# Patient Record
Sex: MALE | Race: WHITE | NOT HISPANIC OR LATINO | URBAN - METROPOLITAN AREA
[De-identification: names, ages, dates, MRNs, and addresses within clinical notes are randomized per-mention and may not be internally consistent; named-entity substitution may affect disease eponyms.]

---

## 2017-08-30 ENCOUNTER — RECORDS - HEALTHEAST (OUTPATIENT)
Dept: LAB | Facility: CLINIC | Age: 77
End: 2017-08-30

## 2017-08-30 LAB
CHOLEST SERPL-MCNC: 157 MG/DL
FASTING STATUS PATIENT QL REPORTED: ABNORMAL
HDLC SERPL-MCNC: 39 MG/DL
LDLC SERPL CALC-MCNC: 104 MG/DL
TRIGL SERPL-MCNC: 72 MG/DL

## 2018-01-22 ENCOUNTER — AMBULATORY - HEALTHEAST (OUTPATIENT)
Dept: VASCULAR SURGERY | Facility: CLINIC | Age: 78
End: 2018-01-22

## 2018-01-22 DIAGNOSIS — I87.2 VENOUS INSUFFICIENCY OF RIGHT LOWER EXTREMITY: ICD-10-CM

## 2018-01-26 ENCOUNTER — OFFICE VISIT - HEALTHEAST (OUTPATIENT)
Dept: VASCULAR SURGERY | Facility: CLINIC | Age: 78
End: 2018-01-26

## 2018-01-26 ENCOUNTER — AMBULATORY - HEALTHEAST (OUTPATIENT)
Dept: VASCULAR SURGERY | Facility: CLINIC | Age: 78
End: 2018-01-26

## 2018-01-26 ENCOUNTER — RECORDS - HEALTHEAST (OUTPATIENT)
Dept: ADMINISTRATIVE | Facility: OTHER | Age: 78
End: 2018-01-26

## 2018-01-26 DIAGNOSIS — L97.919 VENOUS ULCER OF RIGHT LEG (H): ICD-10-CM

## 2018-01-26 DIAGNOSIS — E78.5 HYPERLIPEMIA: ICD-10-CM

## 2018-01-26 DIAGNOSIS — I83.019 VENOUS ULCER OF RIGHT LEG (H): ICD-10-CM

## 2018-01-26 DIAGNOSIS — I87.2 CHRONIC VENOUS INSUFFICIENCY: ICD-10-CM

## 2018-01-26 DIAGNOSIS — M72.2 PLANTAR FASCIITIS: ICD-10-CM

## 2018-01-26 RX ORDER — LOSARTAN POTASSIUM 25 MG/1
1 TABLET ORAL DAILY
Refills: 1 | Status: SHIPPED | COMMUNITY
Start: 2018-01-04

## 2018-01-26 RX ORDER — OXYCODONE AND ACETAMINOPHEN 5; 325 MG/1; MG/1
1 TABLET ORAL EVERY 4 HOURS PRN
Status: SHIPPED | COMMUNITY
Start: 2018-01-26

## 2018-01-26 RX ORDER — METRONIDAZOLE 10 MG/G
1 GEL TOPICAL DAILY
Status: SHIPPED | COMMUNITY
Start: 2018-01-26

## 2018-01-26 RX ORDER — CHOLECALCIFEROL (VITAMIN D3) 10(400)/ML
DROPS ORAL
Status: SHIPPED | COMMUNITY
Start: 2018-01-26

## 2018-01-26 RX ORDER — ASCORBIC ACID 500 MG
500 TABLET ORAL DAILY
Status: SHIPPED | COMMUNITY
Start: 2018-01-26

## 2018-01-26 RX ORDER — CALCIUM CARBONATE/VITAMIN D3 600 MG-20
TABLET ORAL
Status: SHIPPED | COMMUNITY
Start: 2018-01-26

## 2018-02-05 ENCOUNTER — RECORDS - HEALTHEAST (OUTPATIENT)
Dept: VASCULAR ULTRASOUND | Facility: CLINIC | Age: 78
End: 2018-02-05

## 2018-02-05 ENCOUNTER — OFFICE VISIT - HEALTHEAST (OUTPATIENT)
Dept: VASCULAR SURGERY | Facility: CLINIC | Age: 78
End: 2018-02-05

## 2018-02-05 DIAGNOSIS — I87.2 VENOUS INSUFFICIENCY (CHRONIC) (PERIPHERAL): ICD-10-CM

## 2018-02-05 DIAGNOSIS — I87.2 CHRONIC VENOUS INSUFFICIENCY: ICD-10-CM

## 2018-02-05 DIAGNOSIS — L97.319 VENOUS ULCER OF ANKLE, RIGHT (H): ICD-10-CM

## 2018-02-05 DIAGNOSIS — I83.019 VARICOSE VEINS OF RIGHT LOWER EXTREMITY WITH ULCER OF UNSPECIFIED SITE (CODE) (H): ICD-10-CM

## 2018-02-05 DIAGNOSIS — I83.013 VENOUS ULCER OF ANKLE, RIGHT (H): ICD-10-CM

## 2018-02-05 ASSESSMENT — MIFFLIN-ST. JEOR: SCORE: 1705.29

## 2018-02-12 ENCOUNTER — OFFICE VISIT - HEALTHEAST (OUTPATIENT)
Dept: VASCULAR SURGERY | Facility: CLINIC | Age: 78
End: 2018-02-12

## 2018-02-12 DIAGNOSIS — I87.2 CHRONIC VENOUS INSUFFICIENCY: ICD-10-CM

## 2018-02-12 DIAGNOSIS — L97.319 VENOUS ULCER OF ANKLE, RIGHT (H): ICD-10-CM

## 2018-02-12 DIAGNOSIS — I83.013 VENOUS ULCER OF ANKLE, RIGHT (H): ICD-10-CM

## 2018-02-19 ENCOUNTER — OFFICE VISIT - HEALTHEAST (OUTPATIENT)
Dept: VASCULAR SURGERY | Facility: CLINIC | Age: 78
End: 2018-02-19

## 2018-02-19 ENCOUNTER — RECORDS - HEALTHEAST (OUTPATIENT)
Dept: ADMINISTRATIVE | Facility: OTHER | Age: 78
End: 2018-02-19

## 2018-02-19 DIAGNOSIS — L97.319 VENOUS ULCER OF ANKLE, RIGHT (H): ICD-10-CM

## 2018-02-19 DIAGNOSIS — I87.2 CHRONIC VENOUS INSUFFICIENCY: ICD-10-CM

## 2018-02-19 DIAGNOSIS — I83.013 VENOUS ULCER OF ANKLE, RIGHT (H): ICD-10-CM

## 2018-02-26 ENCOUNTER — AMBULATORY - HEALTHEAST (OUTPATIENT)
Dept: VASCULAR SURGERY | Facility: CLINIC | Age: 78
End: 2018-02-26

## 2018-02-26 DIAGNOSIS — I83.013 VENOUS ULCER OF ANKLE, RIGHT (H): ICD-10-CM

## 2018-02-26 DIAGNOSIS — L97.319 VENOUS ULCER OF ANKLE, RIGHT (H): ICD-10-CM

## 2018-03-05 ENCOUNTER — AMBULATORY - HEALTHEAST (OUTPATIENT)
Dept: VASCULAR SURGERY | Facility: CLINIC | Age: 78
End: 2018-03-05

## 2018-03-05 DIAGNOSIS — I83.013 VENOUS ULCER OF ANKLE, RIGHT (H): ICD-10-CM

## 2018-03-05 DIAGNOSIS — L97.319 VENOUS ULCER OF ANKLE, RIGHT (H): ICD-10-CM

## 2018-03-05 ASSESSMENT — MIFFLIN-ST. JEOR: SCORE: 1697.36

## 2018-03-15 ENCOUNTER — OFFICE VISIT - HEALTHEAST (OUTPATIENT)
Dept: VASCULAR SURGERY | Facility: CLINIC | Age: 78
End: 2018-03-15

## 2018-03-15 DIAGNOSIS — I83.90 VARICOSE VEIN OF LEG: ICD-10-CM

## 2018-03-15 DIAGNOSIS — I87.2 CHRONIC VENOUS INSUFFICIENCY: ICD-10-CM

## 2018-03-15 DIAGNOSIS — I83.013 VENOUS ULCER OF ANKLE, RIGHT (H): ICD-10-CM

## 2018-03-15 DIAGNOSIS — L97.319 VENOUS ULCER OF ANKLE, RIGHT (H): ICD-10-CM

## 2018-03-15 DIAGNOSIS — R22.40 LOCALIZED SWELLING OF LOWER LEG: ICD-10-CM

## 2019-10-07 ENCOUNTER — RECORDS - HEALTHEAST (OUTPATIENT)
Dept: LAB | Facility: CLINIC | Age: 79
End: 2019-10-07

## 2019-10-07 LAB
ALBUMIN SERPL-MCNC: 4.2 G/DL (ref 3.5–5)
ALP SERPL-CCNC: 67 U/L (ref 45–120)
ALT SERPL W P-5'-P-CCNC: 24 U/L (ref 0–45)
ANION GAP SERPL CALCULATED.3IONS-SCNC: 8 MMOL/L (ref 5–18)
AST SERPL W P-5'-P-CCNC: 25 U/L (ref 0–40)
BILIRUB SERPL-MCNC: 0.4 MG/DL (ref 0–1)
BUN SERPL-MCNC: 19 MG/DL (ref 8–28)
CALCIUM SERPL-MCNC: 9.8 MG/DL (ref 8.5–10.5)
CHLORIDE BLD-SCNC: 104 MMOL/L (ref 98–107)
CHOLEST SERPL-MCNC: 171 MG/DL
CO2 SERPL-SCNC: 28 MMOL/L (ref 22–31)
CREAT SERPL-MCNC: 0.74 MG/DL (ref 0.7–1.3)
FASTING STATUS PATIENT QL REPORTED: NORMAL
GFR SERPL CREATININE-BSD FRML MDRD: >60 ML/MIN/1.73M2
GLUCOSE BLD-MCNC: 104 MG/DL (ref 70–125)
HDLC SERPL-MCNC: 48 MG/DL
LDLC SERPL CALC-MCNC: 104 MG/DL
POTASSIUM BLD-SCNC: 4.9 MMOL/L (ref 3.5–5)
PROT SERPL-MCNC: 6.8 G/DL (ref 6–8)
SODIUM SERPL-SCNC: 140 MMOL/L (ref 136–145)
TRIGL SERPL-MCNC: 93 MG/DL

## 2019-10-22 ENCOUNTER — RECORDS - HEALTHEAST (OUTPATIENT)
Dept: ADMINISTRATIVE | Facility: OTHER | Age: 79
End: 2019-10-22

## 2020-12-10 ENCOUNTER — AMBULATORY - HEALTHEAST (OUTPATIENT)
Dept: ADMINISTRATIVE | Facility: REHABILITATION | Age: 80
End: 2020-12-10

## 2020-12-10 DIAGNOSIS — Z95.2 S/P TAVR (TRANSCATHETER AORTIC VALVE REPLACEMENT): ICD-10-CM

## 2020-12-21 ENCOUNTER — AMBULATORY - HEALTHEAST (OUTPATIENT)
Dept: CARDIAC REHAB | Facility: HOSPITAL | Age: 80
End: 2020-12-21

## 2020-12-21 DIAGNOSIS — Z95.2 S/P TAVR (TRANSCATHETER AORTIC VALVE REPLACEMENT): ICD-10-CM

## 2020-12-23 ENCOUNTER — AMBULATORY - HEALTHEAST (OUTPATIENT)
Dept: CARDIAC REHAB | Facility: HOSPITAL | Age: 80
End: 2020-12-23

## 2020-12-23 DIAGNOSIS — Z95.2 S/P TAVR (TRANSCATHETER AORTIC VALVE REPLACEMENT): ICD-10-CM

## 2020-12-28 ENCOUNTER — AMBULATORY - HEALTHEAST (OUTPATIENT)
Dept: CARDIAC REHAB | Facility: HOSPITAL | Age: 80
End: 2020-12-28

## 2020-12-28 DIAGNOSIS — Z95.2 S/P TAVR (TRANSCATHETER AORTIC VALVE REPLACEMENT): ICD-10-CM

## 2021-01-04 ENCOUNTER — AMBULATORY - HEALTHEAST (OUTPATIENT)
Dept: CARDIAC REHAB | Facility: HOSPITAL | Age: 81
End: 2021-01-04

## 2021-01-04 DIAGNOSIS — Z95.2 S/P TAVR (TRANSCATHETER AORTIC VALVE REPLACEMENT): ICD-10-CM

## 2021-01-06 ENCOUNTER — AMBULATORY - HEALTHEAST (OUTPATIENT)
Dept: CARDIAC REHAB | Facility: HOSPITAL | Age: 81
End: 2021-01-06

## 2021-01-06 DIAGNOSIS — Z95.2 S/P TAVR (TRANSCATHETER AORTIC VALVE REPLACEMENT): ICD-10-CM

## 2021-01-11 ENCOUNTER — AMBULATORY - HEALTHEAST (OUTPATIENT)
Dept: CARDIAC REHAB | Facility: HOSPITAL | Age: 81
End: 2021-01-11

## 2021-01-11 DIAGNOSIS — Z95.2 S/P TAVR (TRANSCATHETER AORTIC VALVE REPLACEMENT): ICD-10-CM

## 2021-01-13 ENCOUNTER — AMBULATORY - HEALTHEAST (OUTPATIENT)
Dept: CARDIAC REHAB | Facility: HOSPITAL | Age: 81
End: 2021-01-13

## 2021-01-13 DIAGNOSIS — Z95.2 S/P TAVR (TRANSCATHETER AORTIC VALVE REPLACEMENT): ICD-10-CM

## 2021-01-18 ENCOUNTER — AMBULATORY - HEALTHEAST (OUTPATIENT)
Dept: CARDIAC REHAB | Facility: HOSPITAL | Age: 81
End: 2021-01-18

## 2021-01-18 DIAGNOSIS — Z95.2 S/P TAVR (TRANSCATHETER AORTIC VALVE REPLACEMENT): ICD-10-CM

## 2021-01-25 ENCOUNTER — AMBULATORY - HEALTHEAST (OUTPATIENT)
Dept: CARDIAC REHAB | Facility: HOSPITAL | Age: 81
End: 2021-01-25

## 2021-01-25 DIAGNOSIS — Z95.2 S/P TAVR (TRANSCATHETER AORTIC VALVE REPLACEMENT): ICD-10-CM

## 2021-01-27 ENCOUNTER — AMBULATORY - HEALTHEAST (OUTPATIENT)
Dept: CARDIAC REHAB | Facility: HOSPITAL | Age: 81
End: 2021-01-27

## 2021-01-27 DIAGNOSIS — Z95.2 S/P TAVR (TRANSCATHETER AORTIC VALVE REPLACEMENT): ICD-10-CM

## 2021-02-01 ENCOUNTER — AMBULATORY - HEALTHEAST (OUTPATIENT)
Dept: CARDIAC REHAB | Facility: HOSPITAL | Age: 81
End: 2021-02-01

## 2021-02-01 DIAGNOSIS — Z95.2 S/P TAVR (TRANSCATHETER AORTIC VALVE REPLACEMENT): ICD-10-CM

## 2021-02-03 ENCOUNTER — AMBULATORY - HEALTHEAST (OUTPATIENT)
Dept: CARDIAC REHAB | Facility: HOSPITAL | Age: 81
End: 2021-02-03

## 2021-02-03 DIAGNOSIS — Z95.2 S/P TAVR (TRANSCATHETER AORTIC VALVE REPLACEMENT): ICD-10-CM

## 2021-02-08 ENCOUNTER — AMBULATORY - HEALTHEAST (OUTPATIENT)
Dept: CARDIAC REHAB | Facility: HOSPITAL | Age: 81
End: 2021-02-08

## 2021-02-08 DIAGNOSIS — Z95.2 S/P TAVR (TRANSCATHETER AORTIC VALVE REPLACEMENT): ICD-10-CM

## 2021-02-10 ENCOUNTER — AMBULATORY - HEALTHEAST (OUTPATIENT)
Dept: CARDIAC REHAB | Facility: HOSPITAL | Age: 81
End: 2021-02-10

## 2021-02-10 DIAGNOSIS — Z95.2 S/P TAVR (TRANSCATHETER AORTIC VALVE REPLACEMENT): ICD-10-CM

## 2021-02-18 ENCOUNTER — RECORDS - HEALTHEAST (OUTPATIENT)
Dept: ADMINISTRATIVE | Facility: OTHER | Age: 81
End: 2021-02-18

## 2021-02-23 ENCOUNTER — RECORDS - HEALTHEAST (OUTPATIENT)
Dept: ADMINISTRATIVE | Facility: OTHER | Age: 81
End: 2021-02-23

## 2021-03-02 ENCOUNTER — RECORDS - HEALTHEAST (OUTPATIENT)
Dept: ADMINISTRATIVE | Facility: OTHER | Age: 81
End: 2021-03-02

## 2021-03-04 ENCOUNTER — RECORDS - HEALTHEAST (OUTPATIENT)
Dept: ADMINISTRATIVE | Facility: OTHER | Age: 81
End: 2021-03-04

## 2021-03-09 ENCOUNTER — RECORDS - HEALTHEAST (OUTPATIENT)
Dept: ADMINISTRATIVE | Facility: OTHER | Age: 81
End: 2021-03-09

## 2021-03-16 ENCOUNTER — RECORDS - HEALTHEAST (OUTPATIENT)
Dept: ADMINISTRATIVE | Facility: OTHER | Age: 81
End: 2021-03-16

## 2021-03-18 ENCOUNTER — RECORDS - HEALTHEAST (OUTPATIENT)
Dept: ADMINISTRATIVE | Facility: OTHER | Age: 81
End: 2021-03-18

## 2021-03-23 ENCOUNTER — RECORDS - HEALTHEAST (OUTPATIENT)
Dept: ADMINISTRATIVE | Facility: OTHER | Age: 81
End: 2021-03-23

## 2021-03-25 ENCOUNTER — RECORDS - HEALTHEAST (OUTPATIENT)
Dept: ADMINISTRATIVE | Facility: OTHER | Age: 81
End: 2021-03-25

## 2021-03-30 ENCOUNTER — RECORDS - HEALTHEAST (OUTPATIENT)
Dept: ADMINISTRATIVE | Facility: OTHER | Age: 81
End: 2021-03-30

## 2021-04-01 ENCOUNTER — RECORDS - HEALTHEAST (OUTPATIENT)
Dept: ADMINISTRATIVE | Facility: OTHER | Age: 81
End: 2021-04-01

## 2021-04-08 ENCOUNTER — RECORDS - HEALTHEAST (OUTPATIENT)
Dept: ADMINISTRATIVE | Facility: OTHER | Age: 81
End: 2021-04-08

## 2021-04-13 ENCOUNTER — RECORDS - HEALTHEAST (OUTPATIENT)
Dept: ADMINISTRATIVE | Facility: OTHER | Age: 81
End: 2021-04-13

## 2021-04-15 ENCOUNTER — RECORDS - HEALTHEAST (OUTPATIENT)
Dept: ADMINISTRATIVE | Facility: OTHER | Age: 81
End: 2021-04-15

## 2021-04-20 ENCOUNTER — RECORDS - HEALTHEAST (OUTPATIENT)
Dept: ADMINISTRATIVE | Facility: OTHER | Age: 81
End: 2021-04-20

## 2021-04-22 ENCOUNTER — RECORDS - HEALTHEAST (OUTPATIENT)
Dept: ADMINISTRATIVE | Facility: OTHER | Age: 81
End: 2021-04-22

## 2021-04-27 ENCOUNTER — RECORDS - HEALTHEAST (OUTPATIENT)
Dept: ADMINISTRATIVE | Facility: OTHER | Age: 81
End: 2021-04-27

## 2021-04-29 ENCOUNTER — RECORDS - HEALTHEAST (OUTPATIENT)
Dept: ADMINISTRATIVE | Facility: OTHER | Age: 81
End: 2021-04-29

## 2021-05-29 ENCOUNTER — RECORDS - HEALTHEAST (OUTPATIENT)
Dept: ADMINISTRATIVE | Facility: CLINIC | Age: 81
End: 2021-05-29

## 2021-05-31 VITALS — WEIGHT: 217 LBS

## 2021-05-31 VITALS — WEIGHT: 215 LBS | HEIGHT: 72 IN | BODY MASS INDEX: 29.12 KG/M2

## 2021-05-31 VITALS — WEIGHT: 215 LBS | BODY MASS INDEX: 30.1 KG/M2 | HEIGHT: 71 IN

## 2021-06-01 ENCOUNTER — RECORDS - HEALTHEAST (OUTPATIENT)
Dept: ADMINISTRATIVE | Facility: CLINIC | Age: 81
End: 2021-06-01

## 2021-06-02 ENCOUNTER — RECORDS - HEALTHEAST (OUTPATIENT)
Dept: ADMINISTRATIVE | Facility: CLINIC | Age: 81
End: 2021-06-02

## 2021-06-05 VITALS — BODY MASS INDEX: 29.15 KG/M2 | WEIGHT: 209 LBS

## 2021-06-05 VITALS — WEIGHT: 208 LBS | BODY MASS INDEX: 29.01 KG/M2

## 2021-06-05 VITALS — WEIGHT: 206 LBS | BODY MASS INDEX: 28.73 KG/M2

## 2021-06-05 VITALS — BODY MASS INDEX: 28.87 KG/M2 | WEIGHT: 207 LBS

## 2021-06-05 VITALS — WEIGHT: 209 LBS | BODY MASS INDEX: 29.15 KG/M2

## 2021-06-05 VITALS — WEIGHT: 207 LBS | BODY MASS INDEX: 28.87 KG/M2

## 2021-06-05 VITALS — BODY MASS INDEX: 28.73 KG/M2 | WEIGHT: 206 LBS

## 2021-06-05 VITALS — WEIGHT: 205 LBS | BODY MASS INDEX: 28.59 KG/M2

## 2021-06-13 NOTE — PROGRESS NOTES
ITP ASSESSMENT   Assessment Day: Initial    Session Number: 1-2  Precautions: Standard    Diagnosis: Valve    Risk Stratification: Medium    Referring Provider: Eduard Moore MD  EXERCISE  Exercise Assessment: Initial       6 Minute Walk Test   Pre   Pre Exercise HR: 89                    Pre Exercise BP: 139/74      Peak  Peak HR: 116                   Peak BP: 182/89    Peak feet: 1110    Peak O2 SAT: 95    Peak RPE: 5    Peak MPH: 2.1      Symptoms:  Peak Symptoms: Hip discomfort      5 mins. Post  5 Min Post HR: 96    5 Min Post BP: 148/87                           Exercise Plan  Goals Next 30 days   STG #1:  Pt to tolerate 30-40 min of exercise at 2.5-3 METS with no CV sx's, to allow him to resume mod. housecleaning, resume errands/going to the store, and laundry     LTG:  Resume home maintenance, cabin upkeep, all housework, yard work, lt snow removal    Exercise Prescription  Exercise Mode: Treadmill;Bike;Nustep;Arm Erg.;Stairs;Hallway Walking    Frequency: 2x/week    Duration: 30-40 min    Intensity / THR: 20-30 beats above resting heart rate    RPE 11-14  Progression / Met level: 2.5-3 METS    Resistive Training?: Yes      Current Exercise (mins/week): 5      Interventions  Home Exercise:  Mode: Walk    Frequency: 3-4 days a week    Duration: 10-15 min x2      Education Material : Educational videos;Provide written material;Individual education and counseling      Education Completed  Exercise Education Completed: Cardiac Anatomy;Signs and Symptoms;Medication review;RPE;Emergency Plan;Home Exercise;Warm up/cool down;FITT Principles;BP/HR Reponse to exercise;Benefits of Exercise;End point of exercise              Exercise Follow-up/Discharge  Follow up/Discharge: Skilled therapy needed to monitor CV response to exercise, supervised safe progression will be necessary as Pt tends to over exert himself at times.  Education and support for making healthy changes will be provided to manage risk factors.    "NUTRITION  Nutrition Assessment: Initial      Nutrition Risk Factors:  Nutrition Risk Factors: Dyslipidemia;Overweight  Cholesterol: 132  LDL: 88  HDL: 32  Triglycerides: 59      Nutrition Plan  Interventions  Other Nutrition Intervention: Therapist/Pt Discussion;Provide with Written Material    Initial Rate Your Plate Score: 57      Education Completed  Nutrition Education Completed: Low Saturated fat diet;Risk factor overview;Low sodium diet;Weight management      Goals  Nutrition Goals (Next 30 days): Patient can identify their risk factors for CAD;Patient will follow a low saturated fat diet;Patient will follow a low sodium diet      Goals Met  Nutrition Goals Met: Provided Rate your Plate Survey;Reviewed Dietitian schedule;Completed Nutritional Risk Screen      Height, Weight, and  BMI  Weight: 206 lb (93.4 kg)  Height: 5' 11\" (1.803 m)  BMI: 28.74      Nutrition Follow-up  Follow-up/Discharge: Pt is very careful to follow a low sodium diet.  Family provides most of his meals.         Other Risk Factors  Other Risk Factor Assessment: Initial      HTN Risk Factor: Hypertension      Pre Exercise BP: 139/74  Post Exercise BP: 136/80      Hypertension Plan  Goals  HTN Goals: Exercises regularly      Goals Met  HTN Goals Met: Take medication as prescribed;Follow low sodium diet      HTN Interventions  HTN Interventions: Diet consult;Therapist/patient discussion;Provide written material;Offer educational videos      HTN Education Completed  HTN Education Completed: Low sodium diet;Medication review;Risk factor overview      Tobacco Risk Factor: NA       PSYCHOSOCIAL  Psychosocial Assessment: Initial       [facility] KD Q of L Summary Score: 23      PHQ-9 Total Score: 7      Psychosocial Risk Factor: NA      Psychosocial Plan  Interventions  If PHQ-9 is >9, send letter to MD  Interventions: Individual education and counseling       Education Completed  Education Completed: Relaxation/Coping Techniques;Effects of " stress on body      Goals  Goals (Next 30 days): Practicing stress management skills      Goals Met  Goals Met: Identified Support system      Psychosocial Follow-up  Follow-up/Discharge: States he has very little stress, except for Covid concerns.             Patient involved in Goal setting?: Yes      Signature: _____________________________________________________________    Date: __________________    Time: __________________See Doc Flowsheet

## 2021-06-13 NOTE — PROGRESS NOTES
Cardiac Rehab  Phase II Assessment    Assessment Date: 12/21/20    Diagnosis: Severe AS  Date of Onset: 12/2/2020  Procedure: TAVR  Date of Onset: 12/2/2020  ICD/Pacemaker: No Parameters:   Post-op Complications:   ECG History: SR EF%:40-45  Past Medical History:   Past Medical History:   Diagnosis Date     Chronic venous insufficiency 1/26/2018     Hyperlipemia 1/26/2018     Plantar fasciitis 1/26/2018     Patient Active Problem List   Diagnosis     Hyperlipemia     Plantar fasciitis     Chronic venous insufficiency     Venous ulcer of ankle, right (H)         Physical Assessment  Precautions/ Physical Limitations: B torn biceps, B knee DJD  Oxygen: No  O2 Sats: 95  Lung Sounds: clear Edema: R ankle edema (states this is baseline for him)  Incisions: healed well  Sleeping Pattern: good   Appetite: good   Nutrition Risk Screen:     Pain  Location:   Characteristics:  Intensity: (0-10 scale) 0  Current Pain Management: tylenol or aleve  Intervention:   Response: improves    Psychosocial/ Emotional Health  1. In the past 12 months, have you been in a relationship where you have been abused physically, emotionally, sexually or financially? No  notified: NA  2. Who do you turn to for emotional support?: Daughter  3. Do you have cultural or spiritual needs? No  4. Have there been any major life changes in the past 12 months? No    Referral Information  Primary Physician: Eduard Moore MD  Cardiologist:  (Marlys, he is not sure who it is)  Surgeon:     Home exercise/Equipment: Manual TM and Nordictrak     Patient's long-term goal(s): Resume all housework, laundry, yard work, snow removal, cabin upkeep, boat maintenance, care-giver to daughter    1. Living Accommodations: Home Steps: Yes      Support people at home: no   2. Marital Status:   3. Family is able to assist with cares      Orthodoxy/Community involvement: yes  4. [place]        See  Doc Flowsheet

## 2021-06-14 NOTE — PROGRESS NOTES
ITP ASSESSMENT   Assessment Day: 30 Day    Session Number: 9  Precautions: Standard    Diagnosis: Valve    Risk Stratification: Medium    Referring Provider: Eduard Moore MD  EXERCISE  Exercise Assessment: Reassessment                           Exercise Plan  Goals Next 30 days   STG #1:  Pt to tolerate 30-40 min of exercise at 3-3.5 METs with no CV sx's, to allow him to resume mod. housecleaning, resume errands/going to the store, and laundry    : LTG:  Resume home maintenance, cabin upkeep, all housework, yard work, lt snow removal. MET level goal of 5-6      Education Goals: All goals in this section met    Education Goals Met: Patient can state cardiac s/s and appropriate emergency response.;Has system for taking medication.;Medication review.                          Goals Met  Initial ADL's goals met: Pt has reached 3 METs without CV sx - goal met    Initial Progression: Pt is progressing well, limited by bone on bone in bilat knees      Exercise Prescription  Exercise Mode: Treadmill;Bike;Nustep;Arm Erg.;Stairs;Hallway Walking    Frequency: 2x/week    Duration: 40 minutes    Intensity / THR: 20-30 beats above resting heart rate    RPE 11-14  Progression / Met level: 3-3.5    Resistive Training?: Yes      Current Exercise (mins/week): 150      Interventions  Home Exercise:  Mode: Walk    Frequency: 4-5 days/week    Duration: 20-30 minutes      Education Material : Educational videos;Provide written material;Individual education and counseling;Offer educational classes      Education Completed  Exercise Education Completed: Cardiac Anatomy;Signs and Symptoms;Medication review;RPE;Emergency Plan;Home Exercise;Warm up/cool down;FITT Principles;BP/HR Reponse to exercise;Benefits of Exercise;End point of exercise              Exercise Follow-up/Discharge  Follow up/Discharge: Skilled therapy needed to monitor CV response to exercise, supervised safe progression will be necessary as Pt tends to over exert  "himself at times.  Education and support for making healthy changes will be provided to manage risk factors. Pt is often limited by knee pain (joints are bone on bone and R has arthritis)     NUTRITION  Nutrition Assessment: Reassessment      Nutrition Risk Factors:  Nutrition Risk Factors: Dyslipidemia;Overweight  Cholesterol: 132  LDL: 88  HDL: 32  Triglycerides: 59      Nutrition Plan  Interventions  Diet Consult: Completed    Other Nutrition Intervention: Provide with Written Material;Diet Class;Therapist/Pt Discussion;Educational Videos    Initial Rate Your Plate Score: 57      Education Completed  Nutrition Education Completed: Low Saturated fat diet;Risk factor overview;Low sodium diet;Weight management      Goals  Nutrition Goals (Next 30 days): Patient demonstrated understanding of cardiac nutrition, no goals identified for the next 30 days      Goals Met  Nutrition Goals Met: Patient can identify their risk factors for CAD;Completed Nutritional Risk Screen;Provided Rate your Plate Survey;Reviewed Dietitian schedule;Patient follows a low sodium diet;Patient states following a low saturated fat diet      Height, Weight, and  BMI  Weight: 209 lb (94.8 kg)  Height: 5' 11\" (1.803 m)  BMI: 29.16      Nutrition Follow-up  Follow-up/Discharge: RD available for questions as needed       Other Risk Factors  Other Risk Factor Assessment: Reassessment      HTN Risk Factor: Hypertension      Pre Exercise BP: 110/64  Post Exercise BP: 112/60      Hypertension Plan  Goals  HTN Goals: Patient demonstrates understanding of HTN, no goals identified for the next 30 days      Goals Met  HTN Goals Met: Take medication as prescribed;Follow low sodium diet;Exercises regularly      HTN Interventions  HTN Interventions: Diet consult;Offer educational videos;Therapist/patient discussion;Provide written material;Offer educational classes      HTN Education Completed  HTN Education Completed: Low sodium diet;Medication review;Risk " factor overview      Tobacco Risk Factor: NA      Risk Factor Follow-up   Follow-up/Discharge: Reviewed risk factor education, will continue to provide support         PSYCHOSOCIAL  Psychosocial Assessment: Reassessment       [facility] YI Q of L Summary Score: 23      PHQ-9 Total Score: 7      Psychosocial Risk Factor: NA      Psychosocial Plan  Interventions  Interventions: Offer educational videos and classes;Provide written material;Individual education and counseling      Education Completed  Education Completed: Relaxation/Coping Techniques;S/S of depression;Effects of stress on body      Goals  Goals (Next 30 days): Patient demonstrates understanding of stress, no goals identified for the next 30 days      Goals Met  Goals Met: Identified Support system;Oriented to stress management classes;Identify stressors;Practicing stress management skills      Psychosocial Follow-up  Follow-up/Discharge: Continues to deny stress or psychosocial needs, reviewed effects of stress on the heart           Patient involved in Goal setting?: Yes      Signature: _____________________________________________________________    Date: __________________    Time: __________________

## 2021-06-15 NOTE — PROGRESS NOTES
ITP ASSESSMENT   Assessment Day: 60 Day    Session Number: 0  Precautions: Standard    Diagnosis: Valve    Risk Stratification: Medium    Referring Provider: Eduard Moore MD   ITP: Dr. Garcia  EXERCISE  Exercise Assessment: Reassessment    Pt has attended 15 sessions of Phase II Cardiac Rehab. Tolerates 3.5 mets of exercise for 40 minutes without cardiovascular symptoms or EKG changes.                         Exercise Plan  Goals Next 30 days  ADL'S: STG #1:  Pt to tolerate 30-40 min of exercise at 3.5-4.3 METs with no CV sx's, to allow him to resume mod. housecleaning, resume errands/going to the store, and laundry    Leisure: LTG:  Resume home maintenance, cabin upkeep, all housework, yard work, lt snow removal. MET level goal of 5-6    Work: LTG:  Resume home maintenance, cabin upkeep, all housework, yard work, lt snow removal. MET level goal of 5-6      Education Goals: All goals in this section met    Education Goals Met: Patient can state cardiac s/s and appropriate emergency response.;Has system for taking medication.;Medication review.                          Goals Met    30 day ADL'S goals met: Goal Met - Pt tolerates 3.5 mets for up to 40-45 minutes.  30 Day Progression: Pt is unavailable for reassessment of goals. Pt is progressing well throughout the program, increasing workloads as tolerated. He tolerates 3.5 mets for 40 minutes without cardiovascular symptoms or EKG changes.      Initial ADL's goals met: Pt has reached 3 METs without CV sx - goal met    Initial Progression: Pt is progressing well, limited by bone on bone in bilat knees      Exercise Prescription  Exercise Mode: Treadmill;Bike;Nustep;Arm Erg.;Stairs;Hallway Walking    Frequency: 2x/week    Duration: 40 Minutes    Intensity / THR: 20-30 beats above resting heart rate    RPE 11-14  Progression / Met level: 3.5-4.3    Resistive Training?: Yes      Current Exercise (mins/week): 150      Interventions  Home Exercise:  Mode:  "Walk    Frequency: 4-5x/week    Duration: 20-30 Minutes      Education Material : Educational videos;Provide written material;Individual education and counseling;Offer educational classes      Education Completed  Exercise Education Completed: Cardiac Anatomy;Signs and Symptoms;Medication review;RPE;Emergency Plan;Home Exercise;Warm up/cool down;FITT Principles;BP/HR Reponse to exercise;Benefits of Exercise;End point of exercise              Exercise Follow-up/Discharge  Follow up/Discharge: Skilled therapy needed to monitor CV response to exercise, supervised safe progression will be necessary as Pt tends to over exert himself at times.  Education and support for making healthy changes will be provided to manage risk factors. Pt is often limited by knee pain (joints are bone on bone and R has arthritis)   NUTRITION  Nutrition Assessment: Reassessment      Nutrition Risk Factors:  Nutrition Risk Factors: Dyslipidemia;Overweight  Cholesterol: 132  LDL: 88  HDL: 32  Triglycerides: 59      Nutrition Plan  Interventions  Diet Consult: Completed    Other Nutrition Intervention: Provide with Written Material;Diet Class;Therapist/Pt Discussion;Educational Videos    Initial Rate Your Plate Score: 57    Education Completed  Nutrition Education Completed: Low Saturated fat diet;Risk factor overview;Low sodium diet;Weight management      Goals  Nutrition Goals (Next 30 days): Patient demonstrated understanding of cardiac nutrition, no goals identified for the next 30 days      Goals Met  Nutrition Goals Met: Patient can identify their risk factors for CAD;Completed Nutritional Risk Screen;Provided Rate your Plate Survey;Reviewed Dietitian schedule;Patient follows a low sodium diet;Patient states following a low saturated fat diet      Height, Weight, and  BMI  Weight: 207 lb (93.9 kg)  Height: 5' 11\" (1.803 m)  BMI: 28.88      Nutrition Follow-up  Follow-up/Discharge: RD available for questions as needed         Other Risk " Factors  Other Risk Factor Assessment: Reassessment      HTN Risk Factor: Hypertension      Pre Exercise BP: 118/66  Post Exercise BP: 106/62      Hypertension Plan  Goals  HTN Goals: Patient demonstrates understanding of HTN, no goals identified for the next 30 days      Goals Met  HTN Goals Met: Take medication as prescribed;Follow low sodium diet;Exercises regularly      HTN Interventions  HTN Interventions: Diet consult;Offer educational videos;Therapist/patient discussion;Provide written material;Offer educational classes      HTN Education Completed  HTN Education Completed: Low sodium diet;Medication review;Risk factor overview      Tobacco Risk Factor: NA      Risk Factor Follow-up   Follow-up/Discharge: Reviewed risk factor education, will continue to provide support     PSYCHOSOCIAL  Psychosocial Assessment: Reassessment       [facility] KD Q of L Summary Score: 23      PHQ-9 Total Score: 7      Psychosocial Risk Factor: NA      Psychosocial Plan  Interventions  Interventions: Offer educational videos and classes;Provide written material;Individual education and counseling      Education Completed  Education Completed: Relaxation/Coping Techniques;S/S of depression;Effects of stress on body      Goals  Goals (Next 30 days): Patient demonstrates understanding of stress, no goals identified for the next 30 days      Goals Met  Goals Met: Identified Support system;Oriented to stress management classes;Identify stressors;Practicing stress management skills      Psychosocial Follow-up  Follow-up/Discharge: Continues to deny stress or psychosocial needs, reviewed effects of stress on the heart       Patient involved in Goal setting?: No      Signature: _____________________________________________________________    Date: __________________    Time: __________________

## 2021-06-16 PROBLEM — E78.5 HYPERLIPEMIA: Status: ACTIVE | Noted: 2018-01-26

## 2021-06-16 PROBLEM — L97.319 VENOUS ULCER OF ANKLE, RIGHT (H): Status: ACTIVE | Noted: 2018-02-05

## 2021-06-16 PROBLEM — I87.2 CHRONIC VENOUS INSUFFICIENCY: Status: ACTIVE | Noted: 2018-01-26

## 2021-06-16 PROBLEM — I83.013 VENOUS ULCER OF ANKLE, RIGHT (H): Status: ACTIVE | Noted: 2018-02-05

## 2021-06-16 PROBLEM — M72.2 PLANTAR FASCIITIS: Status: ACTIVE | Noted: 2018-01-26

## 2021-06-16 NOTE — PROGRESS NOTES
Date of Service:3/15/2018    Provider's initial consult visit:  1/26/2018    Chief Complaint:   Chief Complaint   Patient presents with     Wound Check       History:   Patient returns to vascular clinic with regards to his lower leg ulcer and swelling.  The  patient was last seen by me on 2/19/2018 when a Endoform and an ABD with a 2-layer wrap was started.  His swelling is improved and he is not having any pain in the ulcer.  He is tolerating the 2-layer wrap without difficulty.      Current Outpatient Prescriptions   Medication Sig Dispense Refill     ascorbic acid, vitamin C, (ASCORBIC ACID WITH AZALIA HIPS) 500 MG tablet Take 500 mg by mouth daily.       aspirin 81 MG EC tablet Take 81 mg by mouth daily.       Ca carb-Ca gluc-Mg ox-Mg gluco (CALCIUM MAGNESIUM) 500 mg calcium -250 mg Tab Take by mouth.       calcium-vitamin D 500 mg(1,250mg) -200 unit per tablet Take 1 tablet by mouth 2 (two) times a day with meals.       digestive enzymes combo no.7 (SUPERIOR DIGESTIVE ENZYME) cap Take by mouth.       flaxseed oil 1,000 mg cap Take by mouth.       lecithin, soy 400 mg cap Take by mouth.       losartan (COZAAR) 25 MG tablet Take 1 tablet by mouth daily.  1     metroNIDAZOLE (METROGEL) 1 % gel Apply 1 application topically daily.       multivitamin (ONE A DAY) per tablet Take by mouth.       oxyCODONE-acetaminophen (PERCOCET) 5-325 mg per tablet Take 1 tablet by mouth every 4 (four) hours as needed for pain.       vitamin E 100 UNIT capsule Take 1 capsule by mouth daily.       Current Facility-Administered Medications   Medication Dose Route Frequency Provider Last Rate Last Dose     lidocaine 2 % jelly (XYLOCAINE)   Topical PRN Radha Haque, CNP   1 application at 03/15/18 0929       No Known Allergies    Physical Exam:    Vitals:    03/15/18 0900   BP: 132/70   Pulse: 72   Resp: 18   Temp: 97.8  F (36.6  C)    There is no height or weight on file to calculate BMI.    General:  77 y.o. male in no apparent  distress.    Head:  Normocephalic atraumatic  Psychiatric:  Cooperative.   Respiratory: unlabored breathing.  Cardiovascular-Lower extremity: Edema: none  Pulses bilateral dorsalis pedis and posterior tibial pulses, are absent. Using a handheld doppler, the bilateral pulse is strong and unrestrictive and biphasic in nature    Vasc Edema 2/12/2018 2/19/2018 2/26/2018 3/5/2018 3/15/2018   Right just above MTP 24 24 24 24.8 24   Right Ankle 23 23 23 23.4 21   Right Widest Calf 35 36 35 36 35   Right Thigh Up 10cm 43 43 - - -   Left - just above MTP 23.5 24 - - -   Left Ankle 23 24 23.5 - -   Left Widest Calf 35 37 36 - -   Left Thigh Up 10cm 43 43 43 - -       Integumentary:      Ulceration(s):  Resolved       Wound 01/26/18 Right Lateral Calf (Active)   Pre Size Length 0 3/15/2018  9:00 AM   Pre Size Width 0 3/15/2018  9:00 AM   Pre Size Depth 0 3/15/2018  9:00 AM   Pre Total Sq cm 0 3/15/2018  9:00 AM   Post Size Length 2 2/19/2018  8:00 AM   Post Size Width 0.9 2/19/2018  8:00 AM   Post Size Depth 0 2/19/2018  8:00 AM   Post Total Sq cm 1.8 2/19/2018  8:00 AM   Description scab 3/15/2018  9:00 AM       Images:  FINDINGS: The right greater saphenous vein is incompetent, with abnormal sustained reflux from the saphenofemoral junction to mid thigh, and in the mid calf. This vein measures 10 mm in diameter at saphenofemoral junction, 9 mm in the proximal thigh, 6   or 7 mm from mid thigh to mid calf, and 4 mm in the distal calf. There is abnormal reflux in the right lesser saphenous vein, near its junction with the popliteal vein. The lesser saphenous vein is competent in the mid calf, and measures 3 or 4 mm in   diameter.     The left greater saphenous vein demonstrates abnormal reflux from mid thigh to mid calf. The vein is otherwise competent, measuring 11 mm at the saphenofemoral junction, and between 6 and 4 mm from proximal thigh to distal calf. No abnormal reflux is   detected in the left lesser saphenous  vein.     No significant deep venous reflux is detected, and no incompetent perforating veins are identified.     There is no evidence for deep vein thrombosis. There is normal flow and compressibility in the femoral, popliteal, and posterior tibial veins.     CONCLUSIONS:   1.  Incompetent right greater saphenous vein.  2.  Abnormal reflux in the left greater saphenous vein, from mid thigh to mid calf area did  3.  abnormal reflux in the right lesser saphenous vein, near the junction with popliteal vein.    Assessment:    Labs:    Lab Results   Component Value Date    WBC 7.9 05/18/2016    HGB 12.3 (L) 05/18/2016    HCT 36.9 (L) 05/18/2016    MCV 95 05/18/2016     (L) 05/18/2016               Invalid input(s): [facility]    Lab Results   Component Value Date    CREATININE 0.80 08/30/2017         Lab Results   Component Value Date    BUN 18 08/30/2017     No results found for: PREALBUMINESUFAST(LABPROT,LABALBU,albumin)@  Invalid input(s): LABALBU  No results found for: PREALBUMIN    No results found for: CRP  No results found for: SEDRATE     Results for orders placed or performed in visit on 08/30/17   Comprehensive Metabolic Panel   Result Value Ref Range    Sodium 142 136 - 145 mmol/L    Potassium 4.3 3.5 - 5.0 mmol/L    Chloride 105 98 - 107 mmol/L    CO2 29 22 - 31 mmol/L    Anion Gap, Calculation 8 5 - 18 mmol/L    Glucose 89 70 - 125 mg/dL    BUN 18 8 - 28 mg/dL    Creatinine 0.80 0.70 - 1.30 mg/dL    GFR MDRD Af Amer >60 >60 mL/min/1.73m2    GFR MDRD Non Af Amer >60 >60 mL/min/1.73m2    Bilirubin, Total 0.6 0.0 - 1.0 mg/dL    Calcium 9.2 8.5 - 10.5 mg/dL    Protein, Total 6.5 6.0 - 8.0 g/dL    Albumin 3.9 3.5 - 5.0 g/dL    Alkaline Phosphatase 58 45 - 120 U/L    AST 21 0 - 40 U/L    ALT 19 0 - 45 U/L       No results found for: HGBA1C  No results found for: TSH        No results found for: [id number]  No results found for: BNP     1. Localized swelling of lower leg  Change dressing   2. Venous ulcer of  ankle, right     3. Chronic venous insufficiency     4. Varicose vein of leg         A new wound was identified today: no.      Assessment/Plan:  1.  None      2. Edema. resolved     3.  Venous insufficiency with ulceration.  Resolved.  Recommended life long compression at 20-30 mmHg.  Discussed the possibility of a venous closure with him.  He will consider this  he will need compression stockings for life and I discussed the possibility of a venous closure by Dr. Gresham.  He will consider this in the future.       4. Treatment:  Will discontinue all dressings.  He will purchase and start wearing compression stocking.     5. Patient to return to clinic with Dr. Gresham if he is interested in a venous ablation or me if he develops any new ulcers     Radha Haque, APRN, CNP,  [facility]  [phone]

## 2021-06-26 NOTE — PROGRESS NOTES
"Progress Notes by Elke Yeung RN at 2/26/2018  9:00 AM     Author: Elke Yeung RN Service: -- Author Type: Registered Nurse    Filed: 2/26/2018  9:34 AM Encounter Date: 2/26/2018 Status: Signed    : Elke Yeung RN (Registered Nurse)           Right leg 2/26    Nurse Visit      Date of Service:2/26/2018    Chief Complaint: Patient presents to clinic for evaluation of their right lower extremity wound    Dressing on Arrival: 2-layer/silvercel/ABD      Allergies: Review of patient's allergies indicates no known allergies.    Assessment:    /62  Pulse 82  Temp 97.8  F (36.6  C) (Oral)     General:  Patient presents to clinic in no apparent distress.  Psychiatric:  Alert and oriented x3.   Lower extremity:  edema is not present.    Integumentary:  Skin is uniformly warm, dry and pink.    Wound size:   Wound 01/26/18 Right Lateral Calf (Active)   Pre Size Length 1.8 2/26/2018  9:00 AM   Pre Size Width 0.8 2/26/2018  9:00 AM   Pre Size Depth 0 1/26/2018  8:00 AM   Pre Total Sq cm 1.44 2/26/2018  9:00 AM   Post Size Length 2 2/19/2018  8:00 AM   Post Size Width 0.9 2/19/2018  8:00 AM   Post Size Depth 0 2/19/2018  8:00 AM   Post Total Sq cm 1.8 2/19/2018  8:00 AM   Description -10.0% decrease  2/19/2018  8:00 AM      Undermining is not present  The periwoundskin is pink     Plan:         1. Patient will Follow up Monday 3/5 for nurse visit, Friday 3/9 with Radha Crisostomo CNP         2. Treatment provided: Patient arrives for one week follow up and dressing change to right leg. Patient states he has tolerated the wrap \"fine\" and denies discomfort. There is no slippage noted on old dressing and dressing had remained in proper position. Old dressing had 4cm x 4 cm area of brownish drainage. This had leaked through onto ABD pad, but not through compression wrap. No foul odor noted. Wound bed is beefy red and hypergranular in appearance. Patient denies pain. Will follow up in one week for dressing " change.     Cleansed with: Remedy skin care cleanser (intact skin)/ normal saline (wound)  Protected skin with: Remedy skin repair cream  Applied: silvercel  Covered with: ABD  Secured with: roll gauze/2-layer compression

## 2021-06-26 NOTE — PROGRESS NOTES
Progress Notes by Radha Haque CNP at 1/26/2018  8:20 AM     Author: Radha Haque CNP Service: -- Author Type: Nurse Practitioner    Filed: 1/26/2018  9:56 AM Encounter Date: 1/26/2018 Status: Signed    : Radha Haque CNP (Nurse Practitioner)       [facility] Consult Note    Date of Service:  1/26/2018    Requesting Provider: Eduard Moore MD    History of Present Illness:     Sixto Alaniz presents to clinic alone regarding his right leg ulcer.  In July, 2017, he developed an ulcer on his right medial leg.  He is applying neomycin on it daily, but it has not resolved.  He saw his primary in October and was instructed to purchase compression stockings.  He did not like the compression stockings so he purchased soccer socks that are tight.  He wears them 24 hours per day.  Earlier this week he saw his primary provider again and he was referred to our clinic for care.  He denies pain in his ulcer and reports that he develops minimal swelling on occasion.    Review of Symptoms:    Constitutional:  Denies fever, chills or night sweats    Integumentary:   See above. Skin is uniformly warm, dry and pink.    Psych:  Yes, currently being treated depression/anxiety      ENTM:  good  sight.  Hard of Hearing: Yes,wears hearing aids     GI:  Nutritional intake:  Appetite is good,   Taking Nutritional supplements: Yes, takesprotein bar  Weight:  no change    Bowels: No concerns     : No Concerns     MSK:   Patient is ambulatory; does not use an assistive device       Neurological:  No weakness, numbness, or tingling    Cardiac:  Denies new chest pain or tightness.    Respiratory:  Denies any unusual SOB    Endocrine:    is not diabetic       Past Medical History:    Past Medical History:   Diagnosis Date   ? Chronic venous insufficiency 1/26/2018   ? Hyperlipemia 1/26/2018   ? Plantar fasciitis 1/26/2018        Surgical History:   Past Surgical History:   Procedure  Laterality Date   ? double hernia     ? HEMORROIDECTOMY         Allergies: No Known Allergies       Medications:    Current Outpatient Prescriptions:   ?  ascorbic acid, vitamin C, (ASCORBIC ACID WITH AZALIA HIPS) 500 MG tablet, Take 500 mg by mouth daily., Disp: , Rfl:   ?  aspirin 81 MG EC tablet, Take 81 mg by mouth daily., Disp: , Rfl:   ?  Ca carb-Ca gluc-Mg ox-Mg gluco (CALCIUM MAGNESIUM) 500 mg calcium -250 mg Tab, Take by mouth., Disp: , Rfl:   ?  calcium-vitamin D 500 mg(1,250mg) -200 unit per tablet, Take 1 tablet by mouth 2 (two) times a day with meals., Disp: , Rfl:   ?  digestive enzymes combo no.7 (SUPERIOR DIGESTIVE ENZYME) cap, Take by mouth., Disp: , Rfl:   ?  flaxseed oil 1,000 mg cap, Take by mouth., Disp: , Rfl:   ?  lecithin, soy 400 mg cap, Take by mouth., Disp: , Rfl:   ?  losartan (COZAAR) 25 MG tablet, Take 1 tablet by mouth daily., Disp: , Rfl: 1  ?  metroNIDAZOLE (METROGEL) 1 % gel, Apply 1 application topically daily., Disp: , Rfl:   ?  multivitamin (ONE A DAY) per tablet, Take by mouth., Disp: , Rfl:   ?  oxyCODONE-acetaminophen (PERCOCET) 5-325 mg per tablet, Take 1 tablet by mouth every 4 (four) hours as needed for pain., Disp: , Rfl:   ?  vitamin E 100 UNIT capsule, Take 1 capsule by mouth daily., Disp: , Rfl:     Family history:   Family History   Problem Relation Age of Onset   ? Diabetes Mother    ? Heart disease Father    ? Diabetes Brother          Social History:   Social History     Social History   ? Marital status:      Spouse name: N/A   ? Number of children: N/A   ? Years of education: N/A     Occupational History   ? Not on file.     Social History Main Topics   ? Smoking status: Never Smoker   ? Smokeless tobacco: Never Used   ? Alcohol use Not on file   ? Drug use: Not on file   ? Sexual activity: Not on file     Other Topics Concern   ? Not on file     Social History Narrative    Lives alone.  .  Has special needs daughter.        Physical Exam    General:  This is a 77 y.o. male who appears their reported age, not in acute distress    Constitution:  Vital Signs: /86  Pulse 60  Temp 98.1  F (36.7  C) (Oral)   Resp 12  Wt 217 lb (98.4 kg) There is no height or weight on file to calculate BMI.    Psychiatric: Alert and oriented X 3, in no apparent distress. Calm and cooperative throughout exam    Head/Neck:  Normocephalic, atraumatic    Cardiovascular:  Rate and Rhythm is regular    Respiratory:  Lungs are clear to auscultation in all fields bilaterally.     Abdomen: Normal bowel sounds. Soft, symmetric, no guarding or rigidity, non tender with palpation.  No organomegaly or masses palpated.     Integumentary/Hair/Nails:  Turgor and texture are normal.   Nails are normal.    MSK:      No ROM deficit    Groin Lymphatics: No inguinal lymphadenopathy:    Neurological:  Grossly intact.  Vascular:    Arterial:    Femoral:  strong and equal bilaterally.     bilateral dorsalis pedis and posterior tibial pulses, are absent. Using a handheld doppler, the bilateral pulse is strong and unrestrictive and biphasic in nature    bilateral toes there is  less than a 3 second capillary refill.     Hair growth on feet is absent bilaterally     Venous:  There is venous distention on theRight legs.  There is spider veins and telangiectasias    There is pitting edema noted in ankles bilaterally.     Circumferential volume measures:    Vasc Edema 1/26/2018   Right just above MTP 25   Right Ankle 27.5   Right Widest Calf 38.4   Right Thigh Up 10cm 44   Left - just above MTP 24.2   Left Ankle 24.7   Left Widest Calf 38.5   Left Thigh Up 10cm 44.4       Ulceration(s)/Wound(s):      Ulceration(s):     Wound bed: %100 adherent slough          Undermining no, Tunneling no   Wound Edge: flush and attached   Periwound:  There is no lucho wound erythema, induration, maceration, denudement fluctuance or warmth surrounding the ulcer(s).  Exudate: serosanguineous            Quantity:  minimal  Odor:  absent   Wound Shape oval    Wound 01/26/18 Right Lateral Calf (Active)   Pre Size Length 2.5 1/26/2018  8:00 AM   Pre Size Width 1.3 1/26/2018  8:00 AM   Pre Size Depth 0 1/26/2018  8:00 AM   Pre Total Sq cm 3.25 1/26/2018  8:00 AM       Photo         Vascular Grade/Stage of Ulcer:      6. Varicose veins and an open venous ulceration    Laboratory studies:     Lab Results   Component Value Date    WBC 7.9 05/18/2016    HGB 12.3 (L) 05/18/2016    HCT 36.9 (L) 05/18/2016    MCV 95 05/18/2016     (L) 05/18/2016     Lab Results   Component Value Date    CREATININE 0.80 08/30/2017     Lab Results   Component Value Date    BUN 18 08/30/2017     No results found for: CRP  No results found for: SEDRATE  Lab Results   Component Value Date    ALBUMIN 3.9 08/30/2017     No results found for: HGBA1C    No results found for: TSH      No results found for: BNP  No results found for this or any previous visit.   Results for orders placed or performed in visit on 08/30/17   Comprehensive Metabolic Panel   Result Value Ref Range    Sodium 142 136 - 145 mmol/L    Potassium 4.3 3.5 - 5.0 mmol/L    Chloride 105 98 - 107 mmol/L    CO2 29 22 - 31 mmol/L    Anion Gap, Calculation 8 5 - 18 mmol/L    Glucose 89 70 - 125 mg/dL    BUN 18 8 - 28 mg/dL    Creatinine 0.80 0.70 - 1.30 mg/dL    GFR MDRD Af Amer >60 >60 mL/min/1.73m2    GFR MDRD Non Af Amer >60 >60 mL/min/1.73m2    Bilirubin, Total 0.6 0.0 - 1.0 mg/dL    Calcium 9.2 8.5 - 10.5 mg/dL    Protein, Total 6.5 6.0 - 8.0 g/dL    Albumin 3.9 3.5 - 5.0 g/dL    Alkaline Phosphatase 58 45 - 120 U/L    AST 21 0 - 40 U/L    ALT 19 0 - 45 U/L     No results found for: [id number]   No results found for: [id number]    Imaging:  None pertinent     Assessment:  1. Venous ulcer of right leg  US Venous Insufficiency Legs Bilateral    Change dressing   2. Hyperlipemia  Change dressing   3. Plantar fasciitis  Change dressing   4. Chronic venous insufficiency  US Venous  Insufficiency Legs Bilateral    Change dressing       Assessment/Plan:  1.  Debridement of the leg ulcer was recommended.  After consent was obtained and topical 2% Xylocaine was applied under clean conditions, and using a #15 blade,the devitalized dermal tissue was debrided for a total square centimeters of 3.25. Following debridement, there was a decrease in the nonviable tissue. The patient tolerated the procedure without any difficulty.      2. Edema. Multifactorial    3.  Venous insufficiency, will Ultrasound of his veins ordered.      4. Nutrition: Encouraged high protein foods and/or nutritional supplements    5. Treatment Will apply Aquacel foam adhesive border and a double layer of Tubigrip.      6. Patient to return to clinic in 2 week(s) for reevaluation. They were instructed to call the clinic sooner with any further questions or concerns. Answered all questions.      Radha Haque, ALF, CNP, [facility]  [phone]

## 2021-06-26 NOTE — PROGRESS NOTES
Progress Notes by Radha Haque CNP at 2/12/2018  8:40 AM     Author: Radha Haque CNP Service: -- Author Type: Nurse Practitioner    Filed: 2/12/2018  9:12 AM Encounter Date: 2/12/2018 Status: Signed    : Radha Haque CNP (Nurse Practitioner)       Date of Service:2/12/2018    Provider's initial consult visit:  1/26/2018    Chief Complaint:   Chief Complaint   Patient presents with   ? Wound Check       History:   Patient returns to vascular clinic with regards to his lower leg ulcer and swelling.  The  patient was last seen by me on 2/5/2018 when a Endoform and an ABD with a 2-layer wrap was started.  His swelling is improved and he is not having any pain in the ulcer.  He is tolerating the 2-layer wrap without difficulty.    Current Outpatient Prescriptions   Medication Sig Dispense Refill   ? ascorbic acid, vitamin C, (ASCORBIC ACID WITH AZALIA HIPS) 500 MG tablet Take 500 mg by mouth daily.     ? aspirin 81 MG EC tablet Take 81 mg by mouth daily.     ? Ca carb-Ca gluc-Mg ox-Mg gluco (CALCIUM MAGNESIUM) 500 mg calcium -250 mg Tab Take by mouth.     ? calcium-vitamin D 500 mg(1,250mg) -200 unit per tablet Take 1 tablet by mouth 2 (two) times a day with meals.     ? digestive enzymes combo no.7 (SUPERIOR DIGESTIVE ENZYME) cap Take by mouth.     ? flaxseed oil 1,000 mg cap Take by mouth.     ? lecithin, soy 400 mg cap Take by mouth.     ? losartan (COZAAR) 25 MG tablet Take 1 tablet by mouth daily.  1   ? metroNIDAZOLE (METROGEL) 1 % gel Apply 1 application topically daily.     ? multivitamin (ONE A DAY) per tablet Take by mouth.     ? oxyCODONE-acetaminophen (PERCOCET) 5-325 mg per tablet Take 1 tablet by mouth every 4 (four) hours as needed for pain.     ? vitamin E 100 UNIT capsule Take 1 capsule by mouth daily.       Current Facility-Administered Medications   Medication Dose Route Frequency Provider Last Rate Last Dose   ? lidocaine 2 % jelly (XYLOCAINE)   Topical PRN Radha Low  Garland, CNP   1 application at 02/12/18 0891       No Known Allergies    Physical Exam:    Vitals:    02/12/18 0700   BP: 142/70   Pulse: 68   Resp: 16   Temp: 99  F (37.2  C)    There is no height or weight on file to calculate BMI.    General:  77 y.o. male in no apparent distress.    Head:  Normocephalic atraumatic  Psychiatric:  Cooperative.   Respiratory: unlabored breathing.  Cardiovascular-Lower extremity: Edema: +2 edema in right leg;  Pulses bilateral dorsalis pedis and posterior tibial pulses, are absent. Using a handheld doppler, the bilateral pulse is strong and unrestrictive and biphasic in nature    Vasc Edema 1/26/2018 2/5/2018 2/12/2018   Right just above MTP 25 26 24   Right Ankle 27.5 28.8 23   Right Widest Calf 38.4 39 35   Right Thigh Up 10cm 44 45 43   Left - just above MTP 24.2 24.9 23.5   Left Ankle 24.7 25.3 23   Left Widest Calf 38.5 37.5 35   Left Thigh Up 10cm 44.4 44.5 43       Integumentary:      Ulceration(s):   Wound bed: Prior to debridement: 10% loose slough and 90% granulation Undermining no, Tunneling no   Wound Edge: attached   Periwound:  There is no lucho wound erythema, induration, maceration, denudement fluctuance or warmth surrounding the ulcer(s).  Exudate: minimal  serous drainage with slight odor.   Wound Shape regular and oval    Wound 01/26/18 Right Lateral Calf (Active)   Pre Size Length 2.5 2/12/2018  8:00 AM   Pre Size Width 1.2 2/12/2018  8:00 AM   Pre Size Depth 0 1/26/2018  8:00 AM   Pre Total Sq cm 3 2/12/2018  8:00 AM   Post Size Length 2.5 2/12/2018  8:00 AM   Post Size Width 0.8 2/12/2018  8:00 AM               Images:  FINDINGS: The right greater saphenous vein is incompetent, with abnormal sustained reflux from the saphenofemoral junction to mid thigh, and in the mid calf. This vein measures 10 mm in diameter at saphenofemoral junction, 9 mm in the proximal thigh, 6   or 7 mm from mid thigh to mid calf, and 4 mm in the distal calf. There is abnormal reflux in  the right lesser saphenous vein, near its junction with the popliteal vein. The lesser saphenous vein is competent in the mid calf, and measures 3 or 4 mm in   diameter.     The left greater saphenous vein demonstrates abnormal reflux from mid thigh to mid calf. The vein is otherwise competent, measuring 11 mm at the saphenofemoral junction, and between 6 and 4 mm from proximal thigh to distal calf. No abnormal reflux is   detected in the left lesser saphenous vein.     No significant deep venous reflux is detected, and no incompetent perforating veins are identified.     There is no evidence for deep vein thrombosis. There is normal flow and compressibility in the femoral, popliteal, and posterior tibial veins.     CONCLUSIONS:   1.  Incompetent right greater saphenous vein.  2.  Abnormal reflux in the left greater saphenous vein, from mid thigh to mid calf area did  3.  abnormal reflux in the right lesser saphenous vein, near the junction with popliteal vein.    Assessment:    Labs:    Lab Results   Component Value Date    WBC 7.9 05/18/2016    HGB 12.3 (L) 05/18/2016    HCT 36.9 (L) 05/18/2016    MCV 95 05/18/2016     (L) 05/18/2016               Invalid input(s): EOSPC    Lab Results   Component Value Date    CREATININE 0.80 08/30/2017         Lab Results   Component Value Date    BUN 18 08/30/2017     No results found for: PREALBUMINESUFAST(LABPROT,LABALBU,albumin)@  Invalid input(s): LABALBU  No results found for: PREALBUMIN    No results found for: CRP  No results found for: SEDRATE     Results for orders placed or performed in visit on 08/30/17   Comprehensive Metabolic Panel   Result Value Ref Range    Sodium 142 136 - 145 mmol/L    Potassium 4.3 3.5 - 5.0 mmol/L    Chloride 105 98 - 107 mmol/L    CO2 29 22 - 31 mmol/L    Anion Gap, Calculation 8 5 - 18 mmol/L    Glucose 89 70 - 125 mg/dL    BUN 18 8 - 28 mg/dL    Creatinine 0.80 0.70 - 1.30 mg/dL    GFR MDRD Af Amer >60 >60 mL/min/1.73m2    GFR MDRD  Non Af Amer >60 >60 mL/min/1.73m2    Bilirubin, Total 0.6 0.0 - 1.0 mg/dL    Calcium 9.2 8.5 - 10.5 mg/dL    Protein, Total 6.5 6.0 - 8.0 g/dL    Albumin 3.9 3.5 - 5.0 g/dL    Alkaline Phosphatase 58 45 - 120 U/L    AST 21 0 - 40 U/L    ALT 19 0 - 45 U/L       No results found for: HGBA1C  No results found for: TSH        No results found for: [id number]  No results found for: BNP     1. Venous ulcer of ankle, right     2. Chronic venous insufficiency         A new wound was identified today: no.      Assessment/Plan:  1.  Debridement of the leg ulcer was recommended.  After consent was obtained and topical 2% Xylocaine was applied under clean conditions, and using a #15 blade,the devitalized dermal tissue was debrided for a total square centimeters of 3.0. Following debridement, there was a decrease in the nonviable tissue. The patient tolerated the procedure without any difficulty.       2. Edema. Multifactorial.  Improved     3.  Venous insufficiency with ulceration.  Improving.   Once his ulcer is healed, he will need compression stockings for life and I will consider a referral for an ablation.     4. Nutrition: Encouraged high protein foods and/or nutritional supplements     5. Treatment:  Will continue with Endoform and an ABD.  A 2-layer wrap will be continued for compression      6. Patient to return to clinic in 1 week(s) for reevaluation.     Radha Haque, APRN, CNP,  CWCN  [facility]  [phone]

## 2021-06-26 NOTE — PROGRESS NOTES
Progress Notes by Radha Haque CNP at 2/5/2018 11:00 AM     Author: Radha Haque CNP Service: -- Author Type: Nurse Practitioner    Filed: 2/5/2018  1:17 PM Encounter Date: 2/5/2018 Status: Signed    : Radha Haque CNP (Nurse Practitioner)       Date of Service:2/5/2018    Provider's initial consult visit:  1/26/2018    Chief Complaint:   Chief Complaint   Patient presents with   ? Wound Check       History:   Patient returns to vascular clinic with regards to his lower leg ulcer and swelling.  The  patient was last seen by me on 1/26/2018 when a venous ultrasound and Aquacel with double layer of Tubigrip was recommended.  He completed his venous ultrasound earlier today and the results were reviewed with him, bilateral deep vein competence with incompetence in the superficial veins.  Right leg is worse compare to the left leg.      Current Outpatient Prescriptions   Medication Sig Dispense Refill   ? ascorbic acid, vitamin C, (ASCORBIC ACID WITH AZALIA HIPS) 500 MG tablet Take 500 mg by mouth daily.     ? aspirin 81 MG EC tablet Take 81 mg by mouth daily.     ? Ca carb-Ca gluc-Mg ox-Mg gluco (CALCIUM MAGNESIUM) 500 mg calcium -250 mg Tab Take by mouth.     ? calcium-vitamin D 500 mg(1,250mg) -200 unit per tablet Take 1 tablet by mouth 2 (two) times a day with meals.     ? digestive enzymes combo no.7 (SUPERIOR DIGESTIVE ENZYME) cap Take by mouth.     ? flaxseed oil 1,000 mg cap Take by mouth.     ? lecithin, soy 400 mg cap Take by mouth.     ? losartan (COZAAR) 25 MG tablet Take 1 tablet by mouth daily.  1   ? metroNIDAZOLE (METROGEL) 1 % gel Apply 1 application topically daily.     ? multivitamin (ONE A DAY) per tablet Take by mouth.     ? oxyCODONE-acetaminophen (PERCOCET) 5-325 mg per tablet Take 1 tablet by mouth every 4 (four) hours as needed for pain.     ? vitamin E 100 UNIT capsule Take 1 capsule by mouth daily.       Current Facility-Administered Medications   Medication Dose  Route Frequency Provider Last Rate Last Dose   ? lidocaine 2 % jelly (XYLOCAINE)   Topical PRN Radha Low Garland, CNP   1 application at 02/05/18 1111       No Known Allergies    Physical Exam:    Vitals:    02/05/18 1101   BP: 138/86   Pulse: 80   Resp: 16   Temp: 98  F (36.7  C)    Body mass index is 29.57 kg/(m^2).    General:  77 y.o. male in no apparent distress.    Head:  Normocephalic atraumatic  Psychiatric:  Cooperative.   Respiratory: unlabored breathing.  Cardiovascular-Lower extremity: Edema: +2 edema in right leg;  Pulses bilateral dorsalis pedis and posterior tibial pulses, are absent. Using a handheld doppler, the bilateral pulse is strong and unrestrictive and biphasic in nature    Vasc Edema 1/26/2018 2/5/2018   Right just above MTP 25 26   Right Ankle 27.5 28.8   Right Widest Calf 38.4 39   Right Thigh Up 10cm 44 45   Left - just above MTP 24.2 24.9   Left Ankle 24.7 25.3   Left Widest Calf 38.5 37.5   Left Thigh Up 10cm 44.4 44.5       Integumentary:      Ulceration(s):   Wound bed: Prior to debridement: 10% loose slough and 90% granulation Undermining no, Tunneling no   Wound Edge: attached   Periwound:  There is no lucho wound erythema, induration, maceration, denudement fluctuance or warmth surrounding the ulcer(s).  Exudate: minimal  serous drainage with slight odor.   Wound Shape regular and oval    Wound 01/26/18 Right Lateral Calf (Active)   Pre Size Length 2.2 2/5/2018 11:00 AM   Pre Size Width 1.2 2/5/2018 11:00 AM   Pre Size Depth 0 1/26/2018  8:00 AM   Pre Total Sq cm 2.53 2/5/2018 11:00 AM   Post Size Length 2.4 2/5/2018 11:00 AM   Post Size Width 1 2/5/2018 11:00 AM               Images:  FINDINGS: The right greater saphenous vein is incompetent, with abnormal sustained reflux from the saphenofemoral junction to mid thigh, and in the mid calf. This vein measures 10 mm in diameter at saphenofemoral junction, 9 mm in the proximal thigh, 6   or 7 mm from mid thigh to mid calf, and 4 mm  in the distal calf. There is abnormal reflux in the right lesser saphenous vein, near its junction with the popliteal vein. The lesser saphenous vein is competent in the mid calf, and measures 3 or 4 mm in   diameter.     The left greater saphenous vein demonstrates abnormal reflux from mid thigh to mid calf. The vein is otherwise competent, measuring 11 mm at the saphenofemoral junction, and between 6 and 4 mm from proximal thigh to distal calf. No abnormal reflux is   detected in the left lesser saphenous vein.     No significant deep venous reflux is detected, and no incompetent perforating veins are identified.     There is no evidence for deep vein thrombosis. There is normal flow and compressibility in the femoral, popliteal, and posterior tibial veins.     CONCLUSIONS:   1.  Incompetent right greater saphenous vein.  2.  Abnormal reflux in the left greater saphenous vein, from mid thigh to mid calf area did  3.  abnormal reflux in the right lesser saphenous vein, near the junction with popliteal vein.    Assessment:    Labs:    Lab Results   Component Value Date    WBC 7.9 05/18/2016    HGB 12.3 (L) 05/18/2016    HCT 36.9 (L) 05/18/2016    MCV 95 05/18/2016     (L) 05/18/2016               Invalid input(s): EOS    Lab Results   Component Value Date    CREATININE 0.80 08/30/2017         Lab Results   Component Value Date    BUN 18 08/30/2017     No results found for: PREALBUMINESUFAST(LABPROT,LABALBU,albumin)@  Invalid input(s): LABALBU  No results found for: PREALBUMIN    No results found for: CRP  No results found for: SEDRATE     Results for orders placed or performed in visit on 08/30/17   Comprehensive Metabolic Panel   Result Value Ref Range    Sodium 142 136 - 145 mmol/L    Potassium 4.3 3.5 - 5.0 mmol/L    Chloride 105 98 - 107 mmol/L    CO2 29 22 - 31 mmol/L    Anion Gap, Calculation 8 5 - 18 mmol/L    Glucose 89 70 - 125 mg/dL    BUN 18 8 - 28 mg/dL    Creatinine 0.80 0.70 - 1.30 mg/dL     GFR MDRD Af Amer >60 >60 mL/min/1.73m2    GFR MDRD Non Af Amer >60 >60 mL/min/1.73m2    Bilirubin, Total 0.6 0.0 - 1.0 mg/dL    Calcium 9.2 8.5 - 10.5 mg/dL    Protein, Total 6.5 6.0 - 8.0 g/dL    Albumin 3.9 3.5 - 5.0 g/dL    Alkaline Phosphatase 58 45 - 120 U/L    AST 21 0 - 40 U/L    ALT 19 0 - 45 U/L       No results found for: HGBA1C  No results found for: TSH        No results found for: CIHIXRME08PD  No results found for: BNP     1. Chronic venous insufficiency     2. Venous ulcer of ankle, right         A new wound was identified today: no.      Assessment/Plan:  1.  Debridement of the leg ulcer was recommended.  After consent was obtained and topical 2% Xylocaine was applied under clean conditions, and using a #15 blade,the devitalized dermal tissue was debrided for a total square centimeters of 3.25. Following debridement, there was a decrease in the nonviable tissue. The patient tolerated the procedure without any difficulty.       2. Edema. Multifactorial     3.  Venous insufficiency with ulceration.  The has significant venous insufficiency in his right leg.  He will need heavier compression level.  Once his ulcer is healed, he will need compression stockings for life and I will consider a referral for an ablation.     4. Nutrition: Encouraged high protein foods and/or nutritional supplements     5. Treatment Will discontinue Aquacel foam adhesive border and start Endoform and cover with an ABD.  A 2-layer wrap will be applied to his right leg and a double layer of Tubigrip to his left leg.       6. Patient to return to clinic in 1 week(s) for reevaluation.     Radha Haque, APRN, CNP,  CWCN  [facility]  [phone]

## 2021-06-26 NOTE — PROGRESS NOTES
Progress Notes by Radha Haque CNP at 2/19/2018  8:40 AM     Author: Radha Haque CNP Service: -- Author Type: Nurse Practitioner    Filed: 2/19/2018  9:33 AM Encounter Date: 2/19/2018 Status: Signed    : Radha Haque CNP (Nurse Practitioner)       Date of Service:2/19/2018    Provider's initial consult visit:  1/26/2018    Chief Complaint:   Chief Complaint   Patient presents with   ? Wound Check       History:   Patient returns to vascular clinic with regards to his lower leg ulcer and swelling.  The  patient was last seen by me on 2/12/2018 when a Endoform and an ABD with a 2-layer wrap was started.  His swelling is improved and he is not having any pain in the ulcer.  He is tolerating the 2-layer wrap without difficulty.    Current Outpatient Prescriptions   Medication Sig Dispense Refill   ? ascorbic acid, vitamin C, (ASCORBIC ACID WITH AZALIA HIPS) 500 MG tablet Take 500 mg by mouth daily.     ? aspirin 81 MG EC tablet Take 81 mg by mouth daily.     ? Ca carb-Ca gluc-Mg ox-Mg gluco (CALCIUM MAGNESIUM) 500 mg calcium -250 mg Tab Take by mouth.     ? calcium-vitamin D 500 mg(1,250mg) -200 unit per tablet Take 1 tablet by mouth 2 (two) times a day with meals.     ? digestive enzymes combo no.7 (SUPERIOR DIGESTIVE ENZYME) cap Take by mouth.     ? flaxseed oil 1,000 mg cap Take by mouth.     ? lecithin, soy 400 mg cap Take by mouth.     ? losartan (COZAAR) 25 MG tablet Take 1 tablet by mouth daily.  1   ? metroNIDAZOLE (METROGEL) 1 % gel Apply 1 application topically daily.     ? multivitamin (ONE A DAY) per tablet Take by mouth.     ? oxyCODONE-acetaminophen (PERCOCET) 5-325 mg per tablet Take 1 tablet by mouth every 4 (four) hours as needed for pain.     ? vitamin E 100 UNIT capsule Take 1 capsule by mouth daily.       Current Facility-Administered Medications   Medication Dose Route Frequency Provider Last Rate Last Dose   ? lidocaine 2 % jelly (XYLOCAINE)   Topical PRN Radha Low  Garland, CNP   1 application at 02/19/18 0836       No Known Allergies    Physical Exam:    Vitals:    02/19/18 0700   BP: 148/72   Pulse: 76   Resp: 16   Temp: 98.8  F (37.1  C)    There is no height or weight on file to calculate BMI.    General:  77 y.o. male in no apparent distress.    Head:  Normocephalic atraumatic  Psychiatric:  Cooperative.   Respiratory: unlabored breathing.  Cardiovascular-Lower extremity: Edema: +1 edema in left leg;  No edema in right leg. Pulses bilateral dorsalis pedis and posterior tibial pulses, are absent. Using a handheld doppler, the bilateral pulse is strong and unrestrictive and biphasic in nature    Vasc Edema 1/26/2018 2/5/2018 2/12/2018 2/19/2018   Right just above MTP 25 26 24 24   Right Ankle 27.5 28.8 23 23   Right Widest Calf 38.4 39 35 36   Right Thigh Up 10cm 44 45 43 43   Left - just above MTP 24.2 24.9 23.5 24   Left Ankle 24.7 25.3 23 24   Left Widest Calf 38.5 37.5 35 37   Left Thigh Up 10cm 44.4 44.5 43 43       Integumentary:      Ulceration(s):   Wound bed: Prior to debridement: 100% granulation and hypergranulation Undermining no, Tunneling no   Wound Edge: attached   Periwound:  There is no lucho wound erythema, induration, maceration, denudement fluctuance or warmth surrounding the ulcer(s).  Exudate: minimal  serous drainage with slight odor.   Wound Shape regular and oval    Wound 01/26/18 Right Lateral Calf (Active)   Pre Size Length 2.5 2/19/2018  8:00 AM   Pre Size Width 1 2/19/2018  8:00 AM   Pre Size Depth 0 1/26/2018  8:00 AM   Pre Total Sq cm 2.5 2/19/2018  8:00 AM   Post Size Length 2 2/19/2018  8:00 AM   Post Size Width 0.9 2/19/2018  8:00 AM   Post Size Depth 1.8 2/19/2018  8:00 AM               Images:  FINDINGS: The right greater saphenous vein is incompetent, with abnormal sustained reflux from the saphenofemoral junction to mid thigh, and in the mid calf. This vein measures 10 mm in diameter at saphenofemoral junction, 9 mm in the proximal  thigh, 6   or 7 mm from mid thigh to mid calf, and 4 mm in the distal calf. There is abnormal reflux in the right lesser saphenous vein, near its junction with the popliteal vein. The lesser saphenous vein is competent in the mid calf, and measures 3 or 4 mm in   diameter.     The left greater saphenous vein demonstrates abnormal reflux from mid thigh to mid calf. The vein is otherwise competent, measuring 11 mm at the saphenofemoral junction, and between 6 and 4 mm from proximal thigh to distal calf. No abnormal reflux is   detected in the left lesser saphenous vein.     No significant deep venous reflux is detected, and no incompetent perforating veins are identified.     There is no evidence for deep vein thrombosis. There is normal flow and compressibility in the femoral, popliteal, and posterior tibial veins.     CONCLUSIONS:   1.  Incompetent right greater saphenous vein.  2.  Abnormal reflux in the left greater saphenous vein, from mid thigh to mid calf area did  3.  abnormal reflux in the right lesser saphenous vein, near the junction with popliteal vein.    Assessment:    Labs:    Lab Results   Component Value Date    WBC 7.9 05/18/2016    HGB 12.3 (L) 05/18/2016    HCT 36.9 (L) 05/18/2016    MCV 95 05/18/2016     (L) 05/18/2016               Invalid input(s): [facility]    Lab Results   Component Value Date    CREATININE 0.80 08/30/2017         Lab Results   Component Value Date    BUN 18 08/30/2017     No results found for: PREALBUMINESUFAST(LABPROT,LABALBU,albumin)@  Invalid input(s): LABALBU  No results found for: PREALBUMIN    No results found for: CRP  No results found for: SEDRATE     Results for orders placed or performed in visit on 08/30/17   Comprehensive Metabolic Panel   Result Value Ref Range    Sodium 142 136 - 145 mmol/L    Potassium 4.3 3.5 - 5.0 mmol/L    Chloride 105 98 - 107 mmol/L    CO2 29 22 - 31 mmol/L    Anion Gap, Calculation 8 5 - 18 mmol/L    Glucose 89 70 - 125 mg/dL    BUN 18  8 - 28 mg/dL    Creatinine 0.80 0.70 - 1.30 mg/dL    GFR MDRD Af Amer >60 >60 mL/min/1.73m2    GFR MDRD Non Af Amer >60 >60 mL/min/1.73m2    Bilirubin, Total 0.6 0.0 - 1.0 mg/dL    Calcium 9.2 8.5 - 10.5 mg/dL    Protein, Total 6.5 6.0 - 8.0 g/dL    Albumin 3.9 3.5 - 5.0 g/dL    Alkaline Phosphatase 58 45 - 120 U/L    AST 21 0 - 40 U/L    ALT 19 0 - 45 U/L       No results found for: HGBA1C  No results found for: TSH        No results found for: TORDJPSO85KG  No results found for: BNP     1. Venous ulcer of ankle, right     2. Chronic venous insufficiency         A new wound was identified today: no.      Assessment/Plan:  1.  Debridement of the leg ulcer was recommended.  After consent was obtained and topical 2% Xylocaine was applied under clean conditions, and using a #15 blade,the devitalized dermal tissue was debrided for a total square centimeters of 3.0. Following debridement, there was a decrease in the nonviable tissue. The patient tolerated the procedure without any difficulty.       2. Edema. Multifactorial.  Improved     3.  Venous insufficiency with ulceration.  Improving.  He had a -10% decrease from last week for a total of 44.% since initial consult.  Once his ulcer is healed, he will need compression stockings for life and I will consider a referral for an ablation.     4. Nutrition: Encouraged high protein foods and/or nutritional supplements     5. Treatment:  Will discontinue Endoform and start an ABD to keep hypergranulation tissue growth down.  A 2-layer wrap will be continued for compression      6. Patient to return to clinic in 1 week(s) for a nurse visit and me in 2 weeks for reevaluation.     Radha Haque, APRN, CNP,  CWCN  [facility]  [phone]

## 2021-06-26 NOTE — PROGRESS NOTES
"Progress Notes by Elke Yeung RN at 3/5/2018  9:00 AM     Author: Elke Yeung RN Service: -- Author Type: Registered Nurse    Filed: 3/5/2018  9:41 AM Encounter Date: 3/5/2018 Status: Signed    : Elke Yeung RN (Registered Nurse)           Right shin 3/5    Nurse Visit      Date of Service:3/5/2018    Chief Complaint: Patient presents to clinic for evaluation of their right lower leg wound and swelling    Dressing on Arrival: 2-layer      Allergies: Review of patient's allergies indicates no known allergies.    Assessment:    /74  Pulse 78  Temp 97.8  F (36.6  C) (Oral)   Resp 18  Ht 5' 11\" (1.803 m)  Wt 215 lb (97.5 kg)  BMI 29.99 kg/m2    General:  Patient presents to clinic in no apparent distress.  Psychiatric:  Alert and oriented x3.   Lower extremity:  edema is present (especially in foot above where wrap ends by toes)  Vasc Edema 2/5/2018 2/12/2018 2/19/2018 2/26/2018 3/5/2018   Right just above MTP 26 24 24 24 24.8   Right Ankle 28.8 23 23 23 23.4   Right Widest Calf 39 35 36 35 36   Right Thigh Up 10cm 45 43 43 - -   Left - just above MTP 24.9 23.5 24 - -   Left Ankle 25.3 23 24 23.5 -   Left Widest Calf 37.5 35 37 36 -   Left Thigh Up 10cm 44.5 43 43 43 -       Integumentary:  Skin is uniformly warm, dry and pink.    Wound size:   Wound 01/26/18 Right Lateral Calf (Active)   Pre Size Length 0 scab 3/5/2018  9:00 AM   Pre Size Width 0 3/5/2018  9:00 AM   Pre Size Depth 0 1/26/2018  8:00 AM   Pre Total Sq cm 0 2/26/2018  9:00 AM   Post Size Length  2/19/2018  8:00 AM   Post Size Width  2/19/2018  8:00 AM   Post Size Depth  2/19/2018  8:00 AM   Post Total Sq cm  2/19/2018  8:00 AM   Description   2/19/2018  8:00 AM         The periwoundskin is pink      Plan:         1. Patient will Follow up Next Thursday for appointment with Radha Crisostomo CNP         2. Treatment provided: Patient arrives today for routine 2-layer dressing change. There is no slippage with old dressing and wrap " "remained in optimal position throughout week. Patient denies pain and discomfort. Does complain of \"foot smelling\" and has been applying powder to feet. No odor noted today. Leg/foot was washed and lotioned. There is no drainage present on the old dressing and wound appears healed with pink skin. There is a small scab present at superior portion of wound that is adherent and is not loose. Patient is unable to come to appointment this Friday so made an appointment with Radha acevedo available in [place] for Thursday the 15th. Let patient know that is 10 days with the wrap on which is the maximum we would want to go before having it changed. Offered a nurse visit for Monday to have wrap changed but patient declined. Plan is for him to all if he would like it changed before the 15th. He is understanding of plan and has no further questions.     Cleansed with: Remedy skin care cleanser  Protected skin with: Remedy skin repair cream  Applied: Silvercel  Covered with: ABD  Secured with: roll gauze/2-layer           "

## 2021-07-27 ENCOUNTER — LAB REQUISITION (OUTPATIENT)
Dept: LAB | Facility: CLINIC | Age: 81
End: 2021-07-27

## 2021-07-27 DIAGNOSIS — R53.82 CHRONIC FATIGUE, UNSPECIFIED: ICD-10-CM

## 2021-07-27 DIAGNOSIS — E78.5 HYPERLIPIDEMIA, UNSPECIFIED: ICD-10-CM

## 2021-07-27 PROCEDURE — 36415 COLL VENOUS BLD VENIPUNCTURE: CPT | Performed by: FAMILY MEDICINE

## 2021-07-27 PROCEDURE — 85014 HEMATOCRIT: CPT | Performed by: FAMILY MEDICINE

## 2021-07-27 PROCEDURE — 84443 ASSAY THYROID STIM HORMONE: CPT | Performed by: FAMILY MEDICINE

## 2021-07-27 PROCEDURE — 82607 VITAMIN B-12: CPT | Performed by: FAMILY MEDICINE

## 2021-07-27 PROCEDURE — 80061 LIPID PANEL: CPT | Performed by: FAMILY MEDICINE

## 2021-07-27 PROCEDURE — 80053 COMPREHEN METABOLIC PANEL: CPT | Performed by: FAMILY MEDICINE

## 2021-07-28 LAB
ALBUMIN SERPL-MCNC: 4.1 G/DL (ref 3.5–5)
ALP SERPL-CCNC: 81 U/L (ref 45–120)
ALT SERPL W P-5'-P-CCNC: 21 U/L (ref 0–45)
ANION GAP SERPL CALCULATED.3IONS-SCNC: 12 MMOL/L (ref 5–18)
AST SERPL W P-5'-P-CCNC: 23 U/L (ref 0–40)
BILIRUB SERPL-MCNC: 0.6 MG/DL (ref 0–1)
BUN SERPL-MCNC: 13 MG/DL (ref 8–28)
CALCIUM SERPL-MCNC: 9.7 MG/DL (ref 8.5–10.5)
CHLORIDE BLD-SCNC: 99 MMOL/L (ref 98–107)
CHOLEST SERPL-MCNC: 186 MG/DL
CO2 SERPL-SCNC: 30 MMOL/L (ref 22–31)
CREAT SERPL-MCNC: 0.84 MG/DL (ref 0.7–1.3)
ERYTHROCYTE [DISTWIDTH] IN BLOOD BY AUTOMATED COUNT: 14.2 % (ref 10–15)
GFR SERPL CREATININE-BSD FRML MDRD: 81 ML/MIN/1.73M2
GLUCOSE BLD-MCNC: 114 MG/DL (ref 70–125)
HCT VFR BLD AUTO: 38.8 % (ref 40–53)
HDLC SERPL-MCNC: 41 MG/DL
HGB BLD-MCNC: 12.6 G/DL (ref 13.3–17.7)
LDLC SERPL CALC-MCNC: 123 MG/DL
MCH RBC QN AUTO: 31.7 PG (ref 26.5–33)
MCHC RBC AUTO-ENTMCNC: 32.5 G/DL (ref 31.5–36.5)
MCV RBC AUTO: 98 FL (ref 78–100)
PLATELET # BLD AUTO: 102 10E3/UL (ref 150–450)
POTASSIUM BLD-SCNC: 4.2 MMOL/L (ref 3.5–5)
PROT SERPL-MCNC: 6.8 G/DL (ref 6–8)
RBC # BLD AUTO: 3.97 10E6/UL (ref 4.4–5.9)
SODIUM SERPL-SCNC: 141 MMOL/L (ref 136–145)
TRIGL SERPL-MCNC: 108 MG/DL
TSH SERPL DL<=0.005 MIU/L-ACNC: 2.33 UIU/ML (ref 0.3–5)
VIT B12 SERPL-MCNC: 1503 PG/ML (ref 213–816)
WBC # BLD AUTO: 5.5 10E3/UL (ref 4–11)

## 2022-08-25 ENCOUNTER — LAB REQUISITION (OUTPATIENT)
Dept: LAB | Facility: CLINIC | Age: 82
End: 2022-08-25

## 2022-08-25 DIAGNOSIS — I35.0 NONRHEUMATIC AORTIC (VALVE) STENOSIS: ICD-10-CM

## 2022-08-25 LAB
ANION GAP SERPL CALCULATED.3IONS-SCNC: 8 MMOL/L (ref 7–15)
BUN SERPL-MCNC: 18.1 MG/DL (ref 8–23)
CALCIUM SERPL-MCNC: 9.6 MG/DL (ref 8.8–10.2)
CHLORIDE SERPL-SCNC: 100 MMOL/L (ref 98–107)
CREAT SERPL-MCNC: 0.91 MG/DL (ref 0.67–1.17)
DEPRECATED HCO3 PLAS-SCNC: 34 MMOL/L (ref 22–29)
GFR SERPL CREATININE-BSD FRML MDRD: 83 ML/MIN/1.73M2
GLUCOSE SERPL-MCNC: 130 MG/DL (ref 70–99)
POTASSIUM SERPL-SCNC: 4.4 MMOL/L (ref 3.4–5.3)
SODIUM SERPL-SCNC: 142 MMOL/L (ref 136–145)

## 2022-08-25 PROCEDURE — 82310 ASSAY OF CALCIUM: CPT | Performed by: FAMILY MEDICINE

## 2023-04-06 ENCOUNTER — LAB REQUISITION (OUTPATIENT)
Dept: LAB | Facility: CLINIC | Age: 83
End: 2023-04-06

## 2023-04-06 DIAGNOSIS — E78.5 HYPERLIPIDEMIA, UNSPECIFIED: ICD-10-CM

## 2023-04-06 DIAGNOSIS — R73.03 PREDIABETES: ICD-10-CM

## 2023-04-06 DIAGNOSIS — I10 ESSENTIAL (PRIMARY) HYPERTENSION: ICD-10-CM

## 2023-04-06 LAB
ALBUMIN SERPL BCG-MCNC: 4.6 G/DL (ref 3.5–5.2)
ALP SERPL-CCNC: 70 U/L (ref 40–129)
ALT SERPL W P-5'-P-CCNC: 31 U/L (ref 10–50)
ANION GAP SERPL CALCULATED.3IONS-SCNC: 14 MMOL/L (ref 7–15)
AST SERPL W P-5'-P-CCNC: 28 U/L (ref 10–50)
BILIRUB SERPL-MCNC: 0.4 MG/DL
BUN SERPL-MCNC: 18.6 MG/DL (ref 8–23)
CALCIUM SERPL-MCNC: 9.6 MG/DL (ref 8.8–10.2)
CHLORIDE SERPL-SCNC: 98 MMOL/L (ref 98–107)
CHOLEST SERPL-MCNC: 207 MG/DL
CREAT SERPL-MCNC: 0.83 MG/DL (ref 0.67–1.17)
DEPRECATED HCO3 PLAS-SCNC: 29 MMOL/L (ref 22–29)
ERYTHROCYTE [DISTWIDTH] IN BLOOD BY AUTOMATED COUNT: 14 % (ref 10–15)
GFR SERPL CREATININE-BSD FRML MDRD: 86 ML/MIN/1.73M2
GLUCOSE SERPL-MCNC: 112 MG/DL (ref 70–99)
HBA1C MFR BLD: 6.8 %
HCT VFR BLD AUTO: 42 % (ref 40–53)
HDLC SERPL-MCNC: 48 MG/DL
HGB BLD-MCNC: 14 G/DL (ref 13.3–17.7)
LDLC SERPL CALC-MCNC: 123 MG/DL
MCH RBC QN AUTO: 32.6 PG (ref 26.5–33)
MCHC RBC AUTO-ENTMCNC: 33.3 G/DL (ref 31.5–36.5)
MCV RBC AUTO: 98 FL (ref 78–100)
NONHDLC SERPL-MCNC: 159 MG/DL
PLAT MORPH BLD: NORMAL
PLATELET # BLD AUTO: 115 10E3/UL (ref 150–450)
POTASSIUM SERPL-SCNC: 4.1 MMOL/L (ref 3.4–5.3)
PROT SERPL-MCNC: 7 G/DL (ref 6.4–8.3)
RBC # BLD AUTO: 4.3 10E6/UL (ref 4.4–5.9)
RBC MORPH BLD: NORMAL
SODIUM SERPL-SCNC: 141 MMOL/L (ref 136–145)
TRIGL SERPL-MCNC: 179 MG/DL
WBC # BLD AUTO: 6.9 10E3/UL (ref 4–11)

## 2023-04-06 PROCEDURE — 85014 HEMATOCRIT: CPT | Performed by: FAMILY MEDICINE

## 2023-04-06 PROCEDURE — 83036 HEMOGLOBIN GLYCOSYLATED A1C: CPT | Performed by: FAMILY MEDICINE

## 2023-04-06 PROCEDURE — 80053 COMPREHEN METABOLIC PANEL: CPT | Performed by: FAMILY MEDICINE

## 2023-04-06 PROCEDURE — 80061 LIPID PANEL: CPT | Performed by: FAMILY MEDICINE

## 2023-11-09 ENCOUNTER — LAB REQUISITION (OUTPATIENT)
Dept: LAB | Facility: CLINIC | Age: 83
End: 2023-11-09

## 2023-11-09 DIAGNOSIS — J02.9 ACUTE PHARYNGITIS, UNSPECIFIED: ICD-10-CM

## 2023-11-09 PROCEDURE — 87081 CULTURE SCREEN ONLY: CPT | Performed by: NURSE PRACTITIONER

## 2023-11-12 LAB — BACTERIA SPEC CULT: NORMAL

## 2024-04-08 ENCOUNTER — LAB REQUISITION (OUTPATIENT)
Dept: LAB | Facility: CLINIC | Age: 84
End: 2024-04-08

## 2024-04-08 DIAGNOSIS — I10 ESSENTIAL (PRIMARY) HYPERTENSION: ICD-10-CM

## 2024-04-08 DIAGNOSIS — E78.5 HYPERLIPIDEMIA, UNSPECIFIED: ICD-10-CM

## 2024-04-08 LAB
ACANTHOCYTES BLD QL SMEAR: NORMAL
AUER BODIES BLD QL SMEAR: NORMAL
BASO STIPL BLD QL SMEAR: NORMAL
BITE CELLS BLD QL SMEAR: NORMAL
BLISTER CELLS BLD QL SMEAR: NORMAL
BURR CELLS BLD QL SMEAR: NORMAL
DACRYOCYTES BLD QL SMEAR: NORMAL
ELLIPTOCYTES BLD QL SMEAR: NORMAL
ERYTHROCYTE [DISTWIDTH] IN BLOOD BY AUTOMATED COUNT: 13.7 % (ref 10–15)
FRAGMENTS BLD QL SMEAR: NORMAL
HCT VFR BLD AUTO: 39.9 % (ref 40–53)
HGB BLD-MCNC: 13.5 G/DL (ref 13.3–17.7)
HGB C CRYSTALS: NORMAL
HOWELL-JOLLY BOD BLD QL SMEAR: NORMAL
MCH RBC QN AUTO: 31.6 PG (ref 26.5–33)
MCHC RBC AUTO-ENTMCNC: 33.8 G/DL (ref 31.5–36.5)
MCV RBC AUTO: 93 FL (ref 78–100)
NEUTS HYPERSEG BLD QL SMEAR: NORMAL
PLAT MORPH BLD: NORMAL
PLATELET # BLD AUTO: ABNORMAL 10*3/UL
POLYCHROMASIA BLD QL SMEAR: NORMAL
RBC # BLD AUTO: 4.27 10E6/UL (ref 4.4–5.9)
RBC AGGLUT BLD QL: NORMAL
RBC MORPH BLD: NORMAL
ROULEAUX BLD QL SMEAR: NORMAL
SICKLE CELLS BLD QL SMEAR: NORMAL
SMUDGE CELLS BLD QL SMEAR: NORMAL
SPHEROCYTES BLD QL SMEAR: NORMAL
STOMATOCYTES BLD QL SMEAR: NORMAL
TARGETS BLD QL SMEAR: NORMAL
TOXIC GRANULES BLD QL SMEAR: NORMAL
VARIANT LYMPHS BLD QL SMEAR: NORMAL
WBC # BLD AUTO: 6.7 10E3/UL (ref 4–11)

## 2024-04-08 PROCEDURE — 80053 COMPREHEN METABOLIC PANEL: CPT | Performed by: FAMILY MEDICINE

## 2024-04-08 PROCEDURE — 80061 LIPID PANEL: CPT | Performed by: FAMILY MEDICINE

## 2024-04-08 PROCEDURE — 85041 AUTOMATED RBC COUNT: CPT | Performed by: FAMILY MEDICINE

## 2024-04-09 LAB
ALBUMIN SERPL BCG-MCNC: 4.5 G/DL (ref 3.5–5.2)
ALP SERPL-CCNC: 80 U/L (ref 40–150)
ALT SERPL W P-5'-P-CCNC: 20 U/L (ref 0–70)
ANION GAP SERPL CALCULATED.3IONS-SCNC: 13 MMOL/L (ref 7–15)
AST SERPL W P-5'-P-CCNC: 21 U/L (ref 0–45)
BILIRUB SERPL-MCNC: 0.4 MG/DL
BUN SERPL-MCNC: 16.1 MG/DL (ref 8–23)
CALCIUM SERPL-MCNC: 9.5 MG/DL (ref 8.8–10.2)
CHLORIDE SERPL-SCNC: 103 MMOL/L (ref 98–107)
CHOLEST SERPL-MCNC: 186 MG/DL
CREAT SERPL-MCNC: 0.74 MG/DL (ref 0.67–1.17)
DEPRECATED HCO3 PLAS-SCNC: 27 MMOL/L (ref 22–29)
EGFRCR SERPLBLD CKD-EPI 2021: 89 ML/MIN/1.73M2
FASTING STATUS PATIENT QL REPORTED: ABNORMAL
GLUCOSE SERPL-MCNC: 122 MG/DL (ref 70–99)
HDLC SERPL-MCNC: 51 MG/DL
LDLC SERPL CALC-MCNC: 108 MG/DL
NONHDLC SERPL-MCNC: 135 MG/DL
POTASSIUM SERPL-SCNC: 4 MMOL/L (ref 3.4–5.3)
PROT SERPL-MCNC: 6.9 G/DL (ref 6.4–8.3)
SODIUM SERPL-SCNC: 143 MMOL/L (ref 135–145)
TRIGL SERPL-MCNC: 137 MG/DL

## 2025-01-09 ENCOUNTER — LAB REQUISITION (OUTPATIENT)
Dept: LAB | Facility: CLINIC | Age: 85
End: 2025-01-09

## 2025-01-09 DIAGNOSIS — I10 ESSENTIAL (PRIMARY) HYPERTENSION: ICD-10-CM

## 2025-01-09 LAB
ERYTHROCYTE [DISTWIDTH] IN BLOOD BY AUTOMATED COUNT: 14 % (ref 10–15)
HCT VFR BLD AUTO: 39.5 % (ref 40–53)
HGB BLD-MCNC: 13.4 G/DL (ref 13.3–17.7)
MCH RBC QN AUTO: 32.5 PG (ref 26.5–33)
MCHC RBC AUTO-ENTMCNC: 33.9 G/DL (ref 31.5–36.5)
MCV RBC AUTO: 96 FL (ref 78–100)
PLATELET # BLD AUTO: 110 10E3/UL (ref 150–450)
RBC # BLD AUTO: 4.12 10E6/UL (ref 4.4–5.9)
WBC # BLD AUTO: 5.7 10E3/UL (ref 4–11)

## 2025-01-09 PROCEDURE — 85014 HEMATOCRIT: CPT | Performed by: FAMILY MEDICINE

## 2025-02-11 ENCOUNTER — TELEPHONE (OUTPATIENT)
Dept: VASCULAR SURGERY | Facility: CLINIC | Age: 85
End: 2025-02-11
Payer: COMMERCIAL

## 2025-02-11 ENCOUNTER — TRANSCRIBE ORDERS (OUTPATIENT)
Dept: OTHER | Age: 85
End: 2025-02-11

## 2025-02-11 DIAGNOSIS — I87.2 VENOUS INSUFFICIENCY: Primary | ICD-10-CM

## 2025-02-11 NOTE — TELEPHONE ENCOUNTER
Vascular Referral Intake    Appointment note (to be pasted into appt note. Also add where additional info is located ie: outside images pushed to PACS, in Epic, sent to HIM, etc):   Referred by Eduard Moore MD for venous insufficiency, stasis    Specialty: Vein Clinic    Specific Provider if Necessary:  MD Justyn Gresham    Visit Type: Return to clinic (last seen 2018); new to [facility]    Time Frame: Next Available    Testing/Imaging Needed Before Consult: none    Chriss or bed needed: No    *Schedulers: Please send welcome letter to patient after appointment(s) have been scheduled*

## 2025-02-11 NOTE — LETTER
February 11, 2025      Sixto Alaniz  [address] [address]        Dear Sixto Alaniz,     Thank you for choosing [facility] for your care.  We are committed to providing you with the highest quality and compassionate healthcare services. Please see your appointment details as well as important information about our clinic below.     Date/Arrival time of appointment: 03/11/2025 at 10:25:am     Name of your Physician: MD Justyn Gresham     What to bring to your appointment:   Insurance Cards and Photo ID   Any paperwork or films from your physician that we have asked you to bring.   List of Medications   IF YOU NEED A WHEELCHAIR, IT IS RECOMMENDED YOU BRING ONE WITH YOU DUE TO LIMITED AVAILABILITY     Special Instructions:   There are no restrictions or special instructions for this visit.    Parking:   Please refer to the map included to direct you. We are located at the [facility] and [facility]: [address], [place], [place], [address]. The clinic is across [address] from [facility]. This is southeast of the intersection of [place] [address] and [address] [place] [address]. Parking is free.    Billing:    This is a hospital-based clinic. Some health insurances cover care in hospital-based clinics differently than stand-alone clinics. Because of this, you may see added charges connected to hospital-based clinics, depending on how your insurance covers copayments, coinsurance, and deductibles. You may want to check with your insurance company to see how they cover facility charges in a hospital-based outpatient location.   You can verify whether the clinic and/or physician is in-network for you by calling your insurance customer service line. If you would like a Good Nikki Estimate for your upcoming appointment(s), contact Cost of Care Estimates at [phone]. Advocates are available Monday through Friday 8 AM - 4:30 PM.   You may also submit a request for an estimate  "through your [employer] account, or by visiting:   [email or web address]  If you have a copay, please note that we do not accept check or cash as a form of payment.   For any other billing questions, you can refer to the [facility] website: [email or web address]     Late Cancellation and No-Show Policy   To ensure that time, personnel, and available equipment are best utilized for the treatment of [facility] Vascular outpatients, a No-show appointment policy has been created. When an appointment has been scheduled and you arrive 15 minutes or later than the appointment \"arrival time\", or fail to cancel the appointment without proper notice and are not in attendance for the appointment, a  no-show  appointment has occurred. A 24-hour notice of cancellation is requested prior to the patient's scheduled appointment. If you fail to cancel without proper notice or  no-show  for 2 or more appointments as a new patient, or 3 or more appointments as a return patient within a 12-month period, you will be discharged from the program. Your referring physician will be contacted to notify them of the discharge.    We hope these instructions are helpful to you.  If you have any questions or concerns, please call us at [phone]. We are in the office Monday-Friday from 8 AM-4:30 PM.     It is our pleasure to assist you.     Warm Regards,     [facility] Vascular Team              "

## 2025-02-26 NOTE — PATIENT INSTRUCTIONS
[place]    Thank you for entrusting your care with us at the [facility].      We are prescribing some compression stockings for you. I have included different suppliers that should help you get measured and fitting to ensure proper fitting socks. You should wear these stockings as much as you can. It is especially important to wear them with long periods of standing, sitting, long car rides or if you will be flying. Compression socks should get refilled every 4-6 months. They do not need to be worn at night while in bed. It is recommended to wear compression level of 20-30mmhg or higher from toes to knees.      We will perform an ultrasound today or prior to your appointment with us in 3 months time to evaluate the valves in your veins.      We would like you to follow up in 3 months after wearing socks to see how you are doing.        Varicose Veins    Varicose veins are twisted, enlarged veins near the surface of the skin. They develop most often in the legs and ankles.    Some people may be more likely than others to get varicose veins because of several things. These include aging, pregnancy, being overweight, or because a parent has them. Standing or sitting for long periods of time can also increase risk of varicose veins.    Follow-up care is a key part of your treatment and safety. Be sure to make and go to all appointments, and call your doctor if you are having problems. It's also a good idea to know your test results and keep a list of the medicines you take.      Varicose veins are caused by weakened valves and veins in your legs. Normally, one-way valves in your veins keep blood flowing from your legs up toward your heart. When these valves don't work as they should, blood collects in your legs, and pressure builds up. The veins become weak, large, and twisted.    How can you care for yourself at home?  Wear compression stockings during the day to help relieve symptoms and improve  blood flow. Talk to your doctor about which ones to get and where to get them.  Prop up your legs at or above the level of your heart when possible. Try to do this for about 30 minutes at a time, about 3 times a day. This helps keep the blood from pooling in your lower legs and improves blood flow to the rest of your body.  Avoid sitting and standing for long periods. This puts added stress on your veins.  Stay at a healthy weight. Lose weight if you need to.  Try to take several short walks every day.  Get at least 30 minutes of exercise on most days of the week. Walking is a good choice. You also may want to do other activities, such as running, swimming, cycling, or playing tennis or team sports.  Do calf muscle exercises every day. When you are sitting down, rotate your feet at the ankles in both directions, making small circles. Extend your legs, and point and flex your feet.  Avoid crossing your legs at the knees when sitting.  Take good care of your skin. Treat cuts and scrapes on your legs right away. Keep your legs clean and moisturized to prevent drying and cracking. Prevent sunburns.  Do not smoke. Smoking can make varicose veins worse. If you need help quitting, talk to your doctor about stop-smoking programs and medicines. These can increase your chances of quitting for good.  If you bump your leg so hard that you know it is likely to bruise, prop up your leg and apply ice or cold packs right away. Apply the ice or cold pack for 10 to 20 minutes, 3 or more times a day. Put a thin cloth between the ice and your skin.  If you cut or scratch the skin over a vein, it may bleed a lot. Prop up your leg and apply firm pressure for a full 15 minutes.  If you have a blood clot in a varicose vein, you may have tenderness and swelling over the vein. The vein may feel firm. Be sure to call your doctor right away if you have these symptoms. If your doctor has told you how to care for the clot, follow the  instructions.     Care may include the following:    Prop up your leg and apply a damp cloth that is warm or cool.  Ask your doctor if you can take an over-the-counter pain medicine, such as acetaminophen (Tylenol), ibuprofen (Advil, Motrin), or naproxen (Aleve). Be safe with medicines. Read and follow all instructions on the label.    When should you call for help?     Call 911 anytime you think you may need emergency care. For example, call if:    You have sudden chest pain and shortness of breath, or you cough up blood.    Call your doctor now or seek immediate medical care if:    You have signs of a blood clot in your leg (called a deep vein thrombosis), such as:  Pain in your calf, back of the knee, thigh, or groin.  Swelling in the leg or groin.  A color change on the leg or groin. The skin may be reddish or purplish, depending on your usual skin color.  A varicose vein begins to bleed and you cannot stop it.  You have a tender lump in your leg.  You get an open sore.    Watch closely for changes in your health, and be sure to contact your doctor if:    Your varicose vein symptoms do not improve with home treatment.    Current as of: December 19, 2022  Author: Healthwise Staff  Medical Review:Milton Dyson MD - Family Medicine & FANY Muse MD - Internal Medicine & Garry Vallejo MD - Family Medicine & Waqas Mackenzie MD - Family Medicine & Ariel Salgado MD - Diagnostic Radiology    Please bring your compression prescription to a home medical supply store. Here is a list of locations but not limited to.     [facility]  [address] [place] [address]  Phone: [phone]  Fax: [phone] [facility] Bldg.  [address] [place] [address]  Phone: [phone]  Fax: [phone]   [facility] Bldg.  [address] [place] [address]  Phone: [phone]  Fax: [phone] [facility]  [address] [place] [address]  Phone: [phone]  Fax: [phone]   [facility]  [address] [place] [address]  Phone: [phone] [facility]   [address] ([facility] [place])  [place] [address]  Phone: [phone]   [place]  [address] [place] [address]      Phone: [phone]  Fax: [phone] [place]  [address] [place] [address]      Phone: [phone]  Fax: [phone]     [facility] [email or web address]  [address], [place] [address]  [phone]    [facility]  [email or web address]  [address] [place] [address]  Phone: [phone]     [address] [place] [address]  Phone: [phone]    [address] [place] [address]  Phone: [phone] [address], [place] [address]  Phone: [phone] [address], [place] [address]  Phone: [phone]    [facility] [email or web address]  [address] [place] [address]  Phone: [phone]    [address], [place] [address]  Phone: [phone] [address], [place] [address]  Phone: [phone]    Tyrone Soto  [email or web address]  [phone]

## 2025-03-11 ENCOUNTER — OFFICE VISIT (OUTPATIENT)
Dept: VASCULAR SURGERY | Facility: CLINIC | Age: 85
End: 2025-03-11
Attending: FAMILY MEDICINE
Payer: COMMERCIAL

## 2025-03-11 VITALS — OXYGEN SATURATION: 95 % | HEART RATE: 92 BPM | SYSTOLIC BLOOD PRESSURE: 137 MMHG | DIASTOLIC BLOOD PRESSURE: 68 MMHG

## 2025-03-11 DIAGNOSIS — I83.892 BLEEDING FROM VARICOSE VEINS OF LEFT LOWER EXTREMITY: ICD-10-CM

## 2025-03-11 DIAGNOSIS — I87.2 VENOUS INSUFFICIENCY: ICD-10-CM

## 2025-03-11 DIAGNOSIS — I83.893 SYMPTOMATIC VARICOSE VEINS OF BOTH LOWER EXTREMITIES: Primary | ICD-10-CM

## 2025-03-11 PROCEDURE — 99203 OFFICE O/P NEW LOW 30 MIN: CPT | Performed by: SPECIALIST

## 2025-03-11 PROCEDURE — G2211 COMPLEX E/M VISIT ADD ON: HCPCS | Performed by: SPECIALIST

## 2025-03-11 PROCEDURE — G0463 HOSPITAL OUTPT CLINIC VISIT: HCPCS | Performed by: SPECIALIST

## 2025-03-11 PROCEDURE — 1125F AMNT PAIN NOTED PAIN PRSNT: CPT | Performed by: SPECIALIST

## 2025-03-11 PROCEDURE — 3078F DIAST BP <80 MM HG: CPT | Performed by: SPECIALIST

## 2025-03-11 PROCEDURE — 3075F SYST BP GE 130 - 139MM HG: CPT | Performed by: SPECIALIST

## 2025-03-11 RX ORDER — TORSEMIDE 20 MG/1
TABLET ORAL
COMMUNITY

## 2025-03-11 ASSESSMENT — PAIN SCALES - GENERAL: PAINLEVEL_OUTOF10: SEVERE PAIN (8)

## 2025-03-11 NOTE — PROGRESS NOTES
[facility]        Patient is here for a consult to discuss Varicose vein(s). 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 Patient states their varicose veins are bothersome when standing, sitting, working, and household chores.     Patient has been using pain medication or anti-inflammatory's. Patient has had recent imaging on legs done. Patient has not done conservative measures which include: compression stockings. Treatment has been unsuccessful .     Educated patient to work on conservative treatments: compression stockings, elevation, exercise, and weight loss.      Pt is currently taking Aspirin.    There were no vitals taken for this visit.    The provider has been notified that the patient has no concerns.     Questions patient would like addressed today are: N/A.    Refills are needed: No    Has homecare services and agency name:  No

## 2025-03-18 ENCOUNTER — OFFICE VISIT (OUTPATIENT)
Dept: VASCULAR SURGERY | Facility: CLINIC | Age: 85
End: 2025-03-18
Attending: SPECIALIST
Payer: COMMERCIAL

## 2025-03-18 ENCOUNTER — TELEPHONE (OUTPATIENT)
Dept: VASCULAR SURGERY | Facility: CLINIC | Age: 85
End: 2025-03-18
Payer: COMMERCIAL

## 2025-03-18 ENCOUNTER — ANCILLARY PROCEDURE (OUTPATIENT)
Dept: VASCULAR ULTRASOUND | Facility: CLINIC | Age: 85
End: 2025-03-18
Attending: SPECIALIST
Payer: COMMERCIAL

## 2025-03-18 VITALS — SYSTOLIC BLOOD PRESSURE: 156 MMHG | HEART RATE: 42 BPM | DIASTOLIC BLOOD PRESSURE: 63 MMHG | OXYGEN SATURATION: 96 %

## 2025-03-18 DIAGNOSIS — I83.893 SYMPTOMATIC VARICOSE VEINS OF BOTH LOWER EXTREMITIES: ICD-10-CM

## 2025-03-18 DIAGNOSIS — I83.893 SYMPTOMATIC VARICOSE VEINS OF BOTH LOWER EXTREMITIES: Primary | ICD-10-CM

## 2025-03-18 DIAGNOSIS — I83.892 BLEEDING FROM VARICOSE VEINS OF LEFT LOWER EXTREMITY: ICD-10-CM

## 2025-03-18 DIAGNOSIS — I87.2 VENOUS INSUFFICIENCY: ICD-10-CM

## 2025-03-18 PROCEDURE — 93970 EXTREMITY STUDY: CPT

## 2025-03-18 PROCEDURE — 99213 OFFICE O/P EST LOW 20 MIN: CPT | Performed by: SPECIALIST

## 2025-03-18 PROCEDURE — G0463 HOSPITAL OUTPT CLINIC VISIT: HCPCS | Performed by: SPECIALIST

## 2025-03-18 PROCEDURE — 3078F DIAST BP <80 MM HG: CPT | Performed by: SPECIALIST

## 2025-03-18 PROCEDURE — 3077F SYST BP >= 140 MM HG: CPT | Performed by: SPECIALIST

## 2025-03-18 PROCEDURE — 1125F AMNT PAIN NOTED PAIN PRSNT: CPT | Performed by: SPECIALIST

## 2025-03-18 ASSESSMENT — PAIN SCALES - GENERAL: PAINLEVEL_OUTOF10: MODERATE PAIN (4)

## 2025-03-18 NOTE — PATIENT INSTRUCTIONS
Pre-Procedure Instructions for Varicose Vein Ablation   We look forward to scheduling a varicose vein procedure for you. First, we will submit a prior authorization to your insurance company if required. This typically takes 10-14 days. We will contact you once we have gotten the approval to schedule the procedure.  The following is helpful information for you regarding your treatment:  **Important: A  will be needed post procedure.  (Unable to use Taxi or Uber).  Please allow 1-2 hours for your vein procedure appointment.  Take your routine medications as you normally would except for blood thinners. Aspirin is ok to continue.  If you take Warfarin, Xarelto, or Eliquis this will need to be HELD prior to procedure according to primary care provider or cardiology who prescribes this medication. Please notify us if you take this medication.  We have thigh high compression stocking sizes medium to X-large that will be applied immediately after the procedure. You will need to provide your own if one of these sizes will not fit. Another option is to bring in knee high compression and biker compression shorts.   Please wear comfortable clothing.  We recommend that you bring a change of undergarment in case it gets stained by the cleansing solution.  Feel free to bring a personal music player or a CD to listen to during your procedure.  It is advised not to fly within 3 weeks after the procedure.  You do not have to fast prior to this procedure.  For any questions regarding your procedure please call [phone] to speak with the nurse.  If you would like a Good Nikki Estimate for your upcoming procedure contact Cost of Care Estimates at [phone], advocates are available Monday through Friday 8am - 4:30pm.    Radiofrequency Ablation Codes:   - 76674 for first vein in either leg and 68093 for second vein  Varicose veins may be a sign of something more severe - venous reflux disease.  Healthy leg veins have valves  that keep blood flowing to the heart. Venous reflux disease develops when the valves stop working properly and allow blood to flow backward (i.e., reflux) and pool in the lower leg veins.   If venous reflux disease is left untreated, symptoms can worsen over time. Your doctor can help you understand if you have this condition.     Superficial venous reflux disease may cause the following signs and symptoms in your legs:  Varicose veins  Aching  Swelling  Cramping  Heaviness or tiredness  Itching  Restlessness  Open skin sores    Superficial venous reflux disease treatment aims to reduce or stop the backward flow of blood. The following may be prescribed to treat your superficial venous reflux disease. Your doctor can help you decide which treatment is best for you:   Compression stockings   Removing diseased vein   Closing diseased vein (through thermal or non-thermal treatment)     Radiofrequency Ablation (RFA) Treatment for Varicose Veins  Radiofrequency ablation (RFA) is a thermal procedure to treat varicose veins. It uses heat created from radiofrequency (RF). During RFA treatment, RF heat is sent into your vein through a thin, flexible tube (catheter). This closes off blood flow in the main problem vein.           Getting ready for your treatment  Tell your provider if you:  Are pregnant or think you may be pregnant  Are breastfeeding  Smoke, use alcohol or street drugs on a regular basis  Have any allergies or intolerances to certain medicines. Explain what reaction you have had to these medicines in the past.      The day of your treatment  The treatment takes 45 to 60 minutes. The entire treatment (including time to prepare and recover) takes about 1 to 3 hours. You can go home the same day. For the treatment:   You'll lie down on a hospital bed.  An imaging method, such as ultrasound, is used to guide the procedure.  The leg to be treated is injected with numbing medicine.  Once your leg is numb, a needle  makes a small hole (puncture) in the vein to be treated.  The catheter with the RF heat source is inserted into your vein.  More numbing medicine may be injected around your vein.  Once the catheter is in the right position, it is then slowly drawn backward. As the catheter sends out heat, the vein is closed off.  In some cases, other side branch varicose veins may be removed or tied off through a few small cuts (incisions).  When the treatment is done, the catheter is removed. Pressure is applied to the insertion site to stop any bleeding. An elastic compression stocking or a bandage may then be put on your leg.       Recovering at home  Once at home, follow all the instructions you've been given. Be sure to:  Check for signs of infection at the catheter insertion sites (see below)  Wear thigh high compressions as directed  Keep your legs raised (elevated) as directed  Walk a few times a day  Avoid heavy exercise, lifting, and standing for long periods as advised. No lifting >20lbs for 2 weeks.   Avoid air travel, hot baths, saunas, or whirlpools as advised  Do not drive or operate heavy machinery for 24 hours after the procedure  Leakage from the numbing medications the first few hours is normal.          Call your healthcare provider if you have any of the following:  Fever of 100.4 F (38 C) or higher, or as directed by your provider  Chest pain or trouble breathing  Signs of infection at the catheter insertion site. These include increased redness or swelling (inflammation), warmth, increasing pain, bleeding, or bad-smelling discharge.  Severe numbness or tingling in the treated leg  Severe pain or swelling in the treated leg      Follow-up  You'll have an ultrasound within the same week as the procedure to check for problems, such as blood clots. You will follow up with the provider after 6 weeks.   Risks and possible complications   These include the following:  Bleeding, Infection, Blood clots, Damage to the  nerves in the treated area, Irritation or burning of the skin over the treated vein. Treatment doesn't improve the look or the symptoms of the problem veins  Risks of any medicines used during the treatment

## 2025-03-18 NOTE — PROGRESS NOTES
[facility]        Patient is here for a 3 month follow up  to discuss Varicose vein(s). 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 Patient states their varicose veins are bothersome when standing, sitting, working, and household chores.     Patient has been using pain medication or anti-inflammatory's. Patient has had recent imaging on legs done. Patient has done conservative measures which include: compression stockings. Treatment has been unsuccessful .     Educated patient to work on conservative treatments: compression stockings, elevation, exercise, and weight loss.      Pt is currently taking Aspirin.    There were no vitals taken for this visit.    The provider has been notified that the patient has no concerns.     Questions patient would like addressed today are: N/A.    Refills are needed: No    Has homecare services and agency name:  No

## 2025-03-18 NOTE — TELEPHONE ENCOUNTER
Venous Closure Prior Authorization Request- Sent     Dr. Justyn Gresham NPI: [id number]  Vascular NPI: [id number] Tax ID: [id number]    Payor:  [place] Member ID: [id number]  Procedure: Radiofrequency ablation (RFA) of CELSA GSV, R SSV   CPTs: 36475 x2 and 36476 x1  Case/Ref #: [id number]   Clinicals Provided: Face Sheet, Office notes, US venous competency, VCSS/CEAP, and PA form  Submitted via: Portal: /[facility]

## 2025-03-18 NOTE — PROGRESS NOTES
US done and reviewed Bilateral GSV/ right SSV Reflux/reviewed  RFA and [facility]  4/14/25 left kidney to be removed due to cancer. Will proceed in June or July.

## 2025-03-18 NOTE — TELEPHONE ENCOUNTER
Vein Prior Authorization Form    Vascular NPI: [id number] Tax ID: [id number]    Ordering/Performing Physician: Dr. Justyn Gresham NPI: [id number]  Payor: [employer] advantage  Procedure (include vein(s) and laterality): Radiofrequency ablation (RFA) of bilateral GSV and right SSV.  CPT: 36475 x2 and 36476 x1  Diagnosis Code: I83.893 Symptomatic varicose veins of bilateral lower extremities and I87.2 Venous insufficiency    Need 2 days for procedure?: Yes  Other instructions: 4/14/25 left kidney to be removed due to cancer. Will proceed in June or July.

## 2025-03-31 ENCOUNTER — LAB REQUISITION (OUTPATIENT)
Dept: LAB | Facility: CLINIC | Age: 85
End: 2025-03-31

## 2025-03-31 DIAGNOSIS — I10 ESSENTIAL (PRIMARY) HYPERTENSION: ICD-10-CM

## 2025-03-31 LAB
ANION GAP SERPL CALCULATED.3IONS-SCNC: 13 MMOL/L (ref 7–15)
BUN SERPL-MCNC: 13.4 MG/DL (ref 8–23)
CALCIUM SERPL-MCNC: 9.8 MG/DL (ref 8.8–10.4)
CHLORIDE SERPL-SCNC: 97 MMOL/L (ref 98–107)
CREAT SERPL-MCNC: 0.7 MG/DL (ref 0.67–1.17)
EGFRCR SERPLBLD CKD-EPI 2021: 90 ML/MIN/1.73M2
ERYTHROCYTE [DISTWIDTH] IN BLOOD BY AUTOMATED COUNT: 13.1 % (ref 10–15)
GLUCOSE SERPL-MCNC: 134 MG/DL (ref 70–99)
HCO3 SERPL-SCNC: 30 MMOL/L (ref 22–29)
HCT VFR BLD AUTO: 37.6 % (ref 40–53)
HGB BLD-MCNC: 11.9 G/DL (ref 13.3–17.7)
MCH RBC QN AUTO: 30.5 PG (ref 26.5–33)
MCHC RBC AUTO-ENTMCNC: 31.6 G/DL (ref 31.5–36.5)
MCV RBC AUTO: 96 FL (ref 78–100)
PLAT MORPH BLD: NORMAL
PLATELET # BLD AUTO: 99 10E3/UL (ref 150–450)
POTASSIUM SERPL-SCNC: 4.1 MMOL/L (ref 3.4–5.3)
RBC # BLD AUTO: 3.9 10E6/UL (ref 4.4–5.9)
RBC MORPH BLD: NORMAL
SODIUM SERPL-SCNC: 140 MMOL/L (ref 135–145)
WBC # BLD AUTO: 6.3 10E3/UL (ref 4–11)

## 2025-03-31 PROCEDURE — 80048 BASIC METABOLIC PNL TOTAL CA: CPT | Performed by: FAMILY MEDICINE

## 2025-03-31 PROCEDURE — 85014 HEMATOCRIT: CPT | Performed by: FAMILY MEDICINE

## 2025-04-24 ENCOUNTER — TRANSFERRED RECORDS (OUTPATIENT)
Dept: HEALTH INFORMATION MANAGEMENT | Facility: CLINIC | Age: 85
End: 2025-04-24
Payer: COMMERCIAL

## 2025-04-24 ENCOUNTER — MEDICAL CORRESPONDENCE (OUTPATIENT)
Dept: HEALTH INFORMATION MANAGEMENT | Facility: CLINIC | Age: 85
End: 2025-04-24
Payer: COMMERCIAL